# Patient Record
Sex: FEMALE | Race: WHITE | Employment: FULL TIME | URBAN - METROPOLITAN AREA
[De-identification: names, ages, dates, MRNs, and addresses within clinical notes are randomized per-mention and may not be internally consistent; named-entity substitution may affect disease eponyms.]

---

## 2018-10-09 ENCOUNTER — HOSPITAL ENCOUNTER (OUTPATIENT)
Dept: PHYSICAL THERAPY | Age: 48
Discharge: HOME | End: 2018-10-09
Payer: MEDICAID

## 2018-10-09 PROCEDURE — 92522 EVALUATE SPEECH PRODUCTION: CPT

## 2018-10-09 NOTE — PROGRESS NOTES
[facility], [address] [facility] [address] [phone]  Fx [phone] Plan of Care/ Statement of Necessity for Speech Therapy Services Patient name: Akanksha Alexis Start of Care: 10/9/2018 Referral source: Suzanne Talavera MD : 1970 Medical Diagnosis: Hoarseness [R49.0] Onset Date:  Treatment Diagnosis: Dysphonia [R49.0] Prior Hospitalization: see medical history Provider#: [id number] Medications: Verified on Patient summary List  
 Comorbidities: N/A Prior Level of Function: Normal vocal quality The Plan of Care and following information is based on the information from the initial evaluation. Assessment/ key information: 
  
Pt is a 41-year-old female referred to outpatient speech therapy due to vocal hoarseness. Pt visited an ENT; Endoscopy showed irritation to the vocal folds due to allergies. At this time, Pt reported feeling excessive tension in the laryngeal area and an uncomfortable feeling during phonation. Additionally, she reported feeling like her voice is lower in pitch and harder to project at work. Pt sustained phonation x15 seconds with observable hoarseness and diplophonia. Harsh vocal attack observed as well. No impact on vocal intensity and loudness. Pt also presented with reduced pitch during pitch glides and was unable to sustain a higher pitch without transitioning to a falsetto. Pt reported that she consumes excessive caffeine and does not drink much water. It is recommended that the Pt receive skilled voice therapy to improve vocal quality and to re-train Pt to rid the laryngeal area of tension for better vocal quality and pitch. Problem List:      []aphasic  []dysarthric  []dysphagic 
     []alexic  []agraphic  [x]dysphonia []dysfluency   []Cognitive-Linguistic Disorder 
     []other Treatment Plan may include any combination of the following: Voice Treatment, Patient Education and [employer] Voice Therapy Patient / Family readiness to learn indicated by: asking questions, trying to perform skills and interest 
 
Persons(s) to be included in education:   patient (P) Barriers to Learning/Limitations: None Patient Goal (s): Decrease trauma to my voice Patient Self Reported Health Status: excellent Rehabilitation Potential: excellent Short Term Goals: To be accomplished in 6 weeks 1) Pt will complete vocal fold stretches x15 with min cues in order to promote relaxation to the laryngeal area for phonation. 2) Pt will complete step 1 of Resonant Voice Therapy with no more than 2 cues for more forward-focused resonance to reduce laryngeal tension during phonation. 3) Pt will be able to state and explain at least 3 vocal hygiene tips to increase knowledge and use of strategies in day-to-day life. Long Term Goals: To be accomplished in 8 weeks 1) Pt will demonstrate improved vocal quality during phonation with min cues for forward resonance in conversations with family, friends, patients, and colleagues. Frequency / Duration: Patient to be seen 1 times per week for 6 weeks: 
 
Patient/ Caregiver education and instruction: Diagnosis, prognosis, treatment program, vocal health and hygeine Erin Barboza, DELMI 10/9/2018 3:25 PM 
________________________________________________________________________ I certify that the above Therapy Services are being furnished while the patient is under my care. I agree with the treatment plan and certify that this therapy is necessary. [de-identified] Signature:____________Date:_________TIME:________ 
 
[employer], Date and Time must be completed for valid certification ** Please sign and return to [facility] 
[address]  Chhaya Goldberg, [address] [facility] Ph [phone]  Fx [phone] Thank you

## 2018-10-18 ENCOUNTER — HOSPITAL ENCOUNTER (OUTPATIENT)
Dept: PHYSICAL THERAPY | Age: 48
Discharge: HOME | End: 2018-10-18
Payer: MEDICAID

## 2018-10-18 PROCEDURE — 92507 TX SP LANG VOICE COMM INDIV: CPT

## 2018-10-18 NOTE — PROGRESS NOTES
ST DAILY TREATMENT NOTE Patient Name: Leslie Dan Date:10/18/2018 : 1970 [x]  Patient  Verified Payor: Payor: [id number] / Plan: [address] [place] [address] / Product Type: Managed Care Medicaid / In time:12:30  Out time:1;15 Total Treatment Time (min): 45 Visit #: 2 of 6 Treatment Diagnosis: Dysphonia [R49.0] SUBJECTIVE Pain Level (0-10 scale): 0 Any medication changes, allergies to medications, adverse drug reactions, diagnosis change, or new procedure performed?: [x] No    [] Yes (see summary sheet for update) Subjective functional status/changes:   [x] No changes reported \"I've been trying not to clear my throat so much. \" OBJECTIVE Treatment provided includes: 
Increase/Improve: 
[x]  Voice Quality []  Cognitive Linguistic Skills []  Laryngeal/Pharyngeal Exercises  
[]  Vocal Loudness []  Reading Comprehension []  Swallowing Skills   
[]  Vocal Cord Function []  Auditory Comprehension []  Oral Motor Skills [x]  Resonance []  Writing Skills []  Compensatory strategies   
[]  Speech Intelligibility []  Expressive Language []  Attention []  Breath Support/Coord. []  Receptive language []  Memory []  Articulation []  Safety Awareness []   
[]  Fluency []  Word Retrieval [] Treatment Provided: 
Pt engaged in 3 vocal fold stretches x10 seconds each with min cues; Visible tension in laryngeal area during stretches Pt completed step 1 with excessive tension in laryngeal area resulting in harsh voice, required mod-max cues to feel the \"buzz\" for forward resonance Patient/Caregiver  Education: [x] Review HEP     
HEP/Handouts given: Continue HEP Pain Level (0-10 scale) post treatment: 0 
 
ASSESSMENT []   Improving appropriately and progressing toward goals [x]   Improving slowly and progressing toward goals 
[]   Approximating goals/maximum potential 
[x]   Continues to benefit from skilled therapy to address remaining functional deficits []   Not progressing toward goals and plan of care will be adjusted Patient will continue to benefit from skilled therapy to address remaining functional deficits: Dysphonia Progress towards goals / Updated goals: 
Pt introduced to Resonant Voice Therapy this session to promote the least amount of tension during phonation. Pt demonstrated understanding of the rationale behind the technique. She was able to hear and feel the difference between forward-focused and laryngeal-focused tone. PLAN[x]  Continue plan of care 
[]  Modify Goals/Treatment Plan     
[]  Discharge due to: 
[] Other: 
 
1) Pt will complete vocal fold stretches x15 with min cues in order to promote relaxation to the laryngeal area for phonation. 2) Pt will complete step 1 of Resonant Voice Therapy with no more than 2 cues for more forward-focused resonance to reduce laryngeal tension during phonation. 3) Pt will be able to state and explain at least 3 vocal hygiene tips to increase knowledge and use of strategies in day-to-day life. DELMI Lee 10/18/2018  2:10 PM 
 
Future Appointments Date Time Provider Oz Pelaez 10/25/2018  2:45 PM DELMI Pritchett [address] [facility]  
11/6/2018  1:45 PM DELMI Pritchett [address] [facility]

## 2018-10-25 ENCOUNTER — HOSPITAL ENCOUNTER (OUTPATIENT)
Dept: PHYSICAL THERAPY | Age: 48
Discharge: HOME | End: 2018-10-25
Payer: MEDICAID

## 2018-10-25 PROCEDURE — 92507 TX SP LANG VOICE COMM INDIV: CPT

## 2018-10-25 NOTE — PROGRESS NOTES
ST DAILY TREATMENT NOTE Patient Name: Samuel Castañeda Date:10/25/2018 : 1970 [x]  Patient  Verified Payor: Payor: [id number] / Plan: [address] [place] [address] / Product Type: Managed Care Medicaid / In time:3:20 (Pt late)  Out time:3:40 Total Treatment Time (min): 20 Visit #: 3 of 6 Treatment Diagnosis: Dysphonia [R49.0] SUBJECTIVE Pain Level (0-10 scale): 0 Any medication changes, allergies to medications, adverse drug reactions, diagnosis change, or new procedure performed?: [x] No    [] Yes (see summary sheet for update) Subjective functional status/changes:   [x] No changes reported \"I've been doing my exercises. \" OBJECTIVE Treatment provided includes: 
Increase/Improve: 
[x]  Voice Quality []  Cognitive Linguistic Skills []  Laryngeal/Pharyngeal Exercises  
[]  Vocal Loudness []  Reading Comprehension []  Swallowing Skills   
[]  Vocal Cord Function []  Auditory Comprehension []  Oral Motor Skills [x]  Resonance []  Writing Skills []  Compensatory strategies   
[]  Speech Intelligibility []  Expressive Language []  Attention []  Breath Support/Coord. []  Receptive language []  Memory []  Articulation []  Safety Awareness []   
[]  Fluency []  Word Retrieval [] Treatment Provided: 
Pt educated on additional exercises to assist with laryngeal tension Pt performed 2 exercises from the therapy program with improved lip buzz (forward resonance) Patient/Caregiver  Education: [x] Review HEP     
HEP/Handouts given: Continue Resonant Voice Therapy Pain Level (0-10 scale) post treatment: 0 
 
ASSESSMENT []   Improving appropriately and progressing toward goals [x]   Improving slowly and progressing toward goals 
[]   Approximating goals/maximum potential 
[x]   Continues to benefit from skilled therapy to address remaining functional deficits []   Not progressing toward goals and plan of care will be adjusted Patient will continue to benefit from skilled therapy to address remaining functional deficits: Dysphonia Progress towards goals / Updated goals: 
Continue Resonant Voice Therapy for improve forward resonance. Continue to encourage Pt to engage in relaxation exercises and stretches to reduce laryngeal tension. PLAN[x]  Continue plan of care 
[]  Modify Goals/Treatment Plan     
[]  Discharge due to: 
[] Other: 
 
1) Pt will complete vocal fold stretches x15 with min cues in order to promote relaxation to the laryngeal area for phonation. 2) Pt will complete step 1 of Resonant Voice Therapy with no more than 2 cues for more forward-focused resonance to reduce laryngeal tension during phonation. 3) Pt will be able to state and explain at least 3 vocal hygiene tips to increase knowledge and use of strategies in day-to-day life. DELMI Cuevas 10/25/2018  2:59 PM 
 
Future Appointments Date Time Provider Oz Pelaez 11/6/2018  1:45 PM Nikolas Hernandez, SLP [facility]

## 2018-11-06 ENCOUNTER — HOSPITAL ENCOUNTER (OUTPATIENT)
Dept: PHYSICAL THERAPY | Age: 48
Discharge: HOME | End: 2018-11-06
Payer: MEDICAID

## 2018-11-06 PROCEDURE — 92507 TX SP LANG VOICE COMM INDIV: CPT

## 2018-11-06 NOTE — PROGRESS NOTES
ST DAILY TREATMENT NOTE Patient Name: Jose Ramon Strickland Date:2018 : 1970 [x]  Patient  Verified Payor: Payor: Misty Lowe / Plan: [address] [place] [address] / Product Type: Managed Care Medicaid / In time:1:45  Out time:2:30 Total Treatment Time (min): 45 Visit #: 1 of 6 Treatment Diagnosis: Dysphonia [R49.0] SUBJECTIVE Pain Level (0-10 scale): 0 Any medication changes, allergies to medications, adverse drug reactions, diagnosis change, or new procedure performed?: [x] No    [] Yes (see summary sheet for update) Subjective functional status/changes:   [x] No changes reported \"I feel more hoarse, I was rivka a lot of stress this weekend. \" OBJECTIVE Treatment provided includes: 
Increase/Improve: 
[]  Voice Quality []  Cognitive Linguistic Skills []  Laryngeal/Pharyngeal Exercises  
[]  Vocal Loudness []  Reading Comprehension []  Swallowing Skills [x]  Vocal Cord Function []  Auditory Comprehension []  Oral Motor Skills [x]  Resonance []  Writing Skills []  Compensatory strategies   
[]  Speech Intelligibility []  Expressive Language []  Attention []  Breath Support/Coord. []  Receptive language []  Memory []  Articulation []  Safety Awareness []   
[]  Fluency []  Word Retrieval [] Treatment Provided: 
Pt required to cues to complete step 1 of resonant Voice Therapy, Noted hoarseness especially during higher pitches Pt introduced to easy onset in isolated vowels; Needed cues to gradually initiate voicing after \"h\" production for wide open glottis Patient/Caregiver  Education: [x] Review HEP     
HEP/Handouts given: Continue HEP Pain Level (0-10 scale) post treatment: 0 
 
ASSESSMENT []   Improving appropriately and progressing toward goals [x]   Improving slowly and progressing toward goals 
[]   Approximating goals/maximum potential 
[x]   Continues to benefit from skilled therapy to address remaining functional deficits []   Not progressing toward goals and plan of care will be adjusted Patient will continue to benefit from skilled therapy to address remaining functional deficits: Dysphonia Progress towards goals / Updated goals: 
Pt introduced to Resonant Voice Therapy. She demonstrates understanding of the rationale for using the program, but continues to show difficulty with laryngeal versus forward tone. However, Pt has demonstrated improvement in her self-awareness of tone differences during structured and unstructured tasks. It is recommended that the Pt continue to receive skilled speech and language therapy. Resonant Voice Therapy will be continued for improved resonance and forward-focused tone. Additionally, we will begin easy onset training to prevent harsh vocal fold attacks during phonation. PLAN[x]  Continue plan of care 
[]  Modify Goals/Treatment Plan     
[]  Discharge due to: 
[] Other: 
 
Goals for this certification period to be accomplished in 6 weeks: 
1) Pt will complete step 1 of Resonant Voice Therapy with no more than 2 cues for more forward-focused resonance to reduce laryngeal tension during phonation. 2) Pt will engage in isolated vowel production using the easy onset technique with 85% accuracy in order to promote relaxed adduction of the vocal folds during phonation. 3) Pt will produce 4-6 word sentences incorporating initial /h/ words for easy onset with 85% accuracy in order to promote easy speech during conversational exchanges. 
[address], SLP 11/6/2018  12:32 PM 
 
Future Appointments Date Time Provider Oz Pelaez 11/6/2018  1:45 PM Rochelle Napier , SLP [facility]  
11/15/2018  1:45 PM Esa Jane, SLP [facility]  
11/20/2018  1:45 PM Esa Jane, SLP [facility]

## 2018-11-06 NOTE — PROGRESS NOTES
[facility] 
[address], [address] [facility] Ph [phone]  Fx [phone] Continued Plan of Care/ Re-certification for Speech Therapy Services Patient name: Carlos Villatoro Start of Care: 10/9/18 Referral source: Lanis Koyanagi, MD : 1970 Medical/Treatment Diagnosis: Hoarseness [R49.0] Onset Date: Prior Hospitalization: see medical history Provider#: [id number] Medications: Verified on Patient Summary List   
Comorbidities: N/A Prior Level of Function: Normal vocal quality Visits from Start of Care: 3    Missed Visits: 1 The Plan of Care and following information is based on the patient's current status: 
 
*Pt's schedule is very busy and sometimes prevents her from arriving on time to appointments. Current goals should be included in the next therapy block for consistency. Goal: Pt will complete vocal fold stretches x15 with min cues in order to promote relaxation to the laryngeal area for phonation. Status at eval: Visible tension in laryngeal musculature, especially during phonation Current Status: GOAL MET; Pt requires 0 cues to complete stretching for the laryngeal area Goal: Pt will complete step 1 of Resonant Voice Therapy with no more than 2 cues for more forward-focused resonance to reduce laryngeal tension during phonation. Status at eval: Harsh, laryngeal-focused tone; Hoarseness and difficulty with pitch variations Current Status: Progressing, Pt requires 2-4 cues to produce forward focused tone during exercises; Harsh onset observed Goal: Pt will be able to state and explain at least 3 vocal hygiene tips to increase knowledge and use of strategies in day-to-day life. Status at eval: Pt consumed a great deal of caffeine, consistent throat clearer, loud talker, all reported by PT; She was unaware of vocal hygeine Current Status: GOAL MET; Pt demonstrates understanding of common vocal hygiene tips (hydration, decreasing caffeine intake, etc.) Key functional changes: Pt introduced to Resonant Voice Therapy. She demonstrates understanding of the rationale for using the program, but continues to show difficulty with laryngeal versus forward tone. However, Pt has demonstrated improvement in her self-awareness of tone differences during structured and unstructured tasks. Problems/ barriers to goal attainment: Busy work schedule Problem List:      []aphasic  []dysarthric  []dysphagic 
     []alexic  []agraphic  [x]dysphonia []dysfluency  []Cognitive-Linguistic Disorder 
     []other Treatment Plan: Voice Treatment and Patient Education Patient Goal (s) has been updated and includes: Easy onset, Resonant Voice Therapy, Pt education of vocal hygeine Goals for this certification period to be accomplished in 6 weeks: 
1) Pt will complete step 1 of Resonant Voice Therapy with no more than 2 cues for more forward-focused resonance to reduce laryngeal tension during phonation. 2) Pt will engage in isolated vowel production using the easy onset technique with 85% accuracy in order to promote relaxed adduction of the vocal folds during phonation. 3) Pt will produce 4-6 word sentences incorporating initial /h/ words for easy onset with 85% accuracy in order to promote easy speech during conversational exchanges. Frequency / Duration: Patient to be seen 1 times per week for 6 weeks: 
Assessment/Recommendations: It is recommended that the Pt continue to receive skilled speech and language therapy. Resonant Voice Therapy will be continued for improved resonance and forward-focused tone. Additionally, we will begin easy onset training to prevent harsh vocal fold attacks during phonation. Certification Period: 11/6/18 to 12/5/18 DELMI Driscoll 11/6/2018 11:49 AM 
 
_____________________________________________________________________ I certify that the above Therapy Services are being furnished while the patient is under my care. I agree with the treatment plan and certify that this therapy is necessary. []  I have read the above report and request that my patient continue as recommended. []  I have read the above report and request that my patient continue therapy with the following changes/special instructions:________________________________________ []I have read the above report and request that my patient be discharged from therapy. [de-identified] Signature:____________Date:_________TIME:________ 
 
[employer], Date and Time must be completed for valid certification ** Please sign and return to [facility] 
[address] Chhaya Goldberg, [address] [facility] [address] [phone]  Fx [phone]

## 2018-11-15 ENCOUNTER — APPOINTMENT (OUTPATIENT)
Dept: PHYSICAL THERAPY | Age: 48
End: 2018-11-15
Payer: MEDICAID

## 2018-11-20 ENCOUNTER — APPOINTMENT (OUTPATIENT)
Dept: PHYSICAL THERAPY | Age: 48
End: 2018-11-20
Payer: MEDICAID

## 2018-12-13 NOTE — PROGRESS NOTES
[facility]  [address] [facility] [address] [place], [address]  Ph [phone]  Fx [phone]    Speech Therapy Discharge Summary    Patient name: Florence Velasquez Start of Care: 10/9/18   Referral source: Tanesha Mcduffie MD : 1970   Medical/Treatment Diagnosis: Dysphonia [R49.0] Onset Date:     Prior Hospitalization: see medical history Provider#: [id number]   Medications: Verified on Patient Summary List    Comorbidities: N/A  Prior Level of Function:Normal vocal quality    Visits from Start of Care: 4    Missed Visits: 1    Summary of Care:  Goal: 1) Pt will complete step 1 of Resonant Voice Therapy with no more than 2 cues for more forward-focused resonance to reduce laryngeal tension during phonation. Status at start of goal: Excessive tension in laryngeal area resulting in harsh voice, required mod-max cues to feel the \"buzz\" for forward resonance  Status at last note/certification: Pt requires 2-4 cues to produce forward focused tone during exercises; Harsh onset observed  Status at discharge: Progressing; Pt required mod-to-max cues to promote forward resonance    Goal: Pt will engage in isolated vowel production using the easy onset technique with 85% accuracy in order to promote relaxed adduction of the vocal folds during phonation. GOAL WAS NOT INITATED    Goal: Pt will produce 4-6 word sentences incorporating initial /h/ words for easy onset with 85% accuracy in order to promote easy speech during conversational exchanges. GOAL WAS NOT INITATED    ASSESSMENT: Pt willingly participated in Resonant Voice Therapy. She required consistent cueing to prevent laryngeal tension and strain during phonation. Pt explained that her work schedule had become very busy and that she would contact the office should she need additional therapy. Please do not hesitate to refer Pt back for skilled voice therapy.     RECOMMENDATIONS:  []Discontinue therapy: []Patient has reached or is progressing toward set goals      [x]Patient is non-compliant or has abdicated      []Due to lack of appreciable progress towards set [address] [employer] , SLP 12/13/2018 10:55 AM

## 2019-06-27 ENCOUNTER — HOSPITAL ENCOUNTER (OUTPATIENT)
Dept: PHYSICAL THERAPY | Age: 49
Discharge: HOME | End: 2019-06-27
Payer: COMMERCIAL

## 2019-06-27 PROCEDURE — 97530 THERAPEUTIC ACTIVITIES: CPT

## 2019-06-27 PROCEDURE — 97140 MANUAL THERAPY 1/> REGIONS: CPT

## 2019-06-27 PROCEDURE — 97110 THERAPEUTIC EXERCISES: CPT

## 2019-06-27 PROCEDURE — 97162 PT EVAL MOD COMPLEX 30 MIN: CPT

## 2019-06-27 NOTE — PROGRESS NOTES
PT DAILY TREATMENT NOTE/CERVICAL [id number]    Patient Name: Moon العلي  Date:2019  : 1970  [x]  Patient  Verified  Payor: /    In time:110  Out time:210  Total Treatment Time (min): 60  Visit #: 1 of 12    Medicare/BCBS Only   Total Timed Codes (min):  45 1:1 Treatment Time:  60     Treatment Area: Cervical radiculopathy [M54.12]    SUBJECTIVE  Pain Level (0-10 scale): 2/10 without activity, 3/10 with activity  []constant []intermittent []improving []worsening []no change since onset    Any medication changes, allergies to medications, adverse drug reactions, diagnosis change, or new procedure performed?: [x] No    [] Yes (see summary sheet for update)  Subjective functional status/changes: patient reports onset of CS and right arm radicular pain in 2019. Currently symptoms are  less sevcere. Lasting for several months. Startied doing traction and using salt bath. After that significant improvement. Initial pain pain 7/10 including right arm tingling C6. Still intermittent numbness. Pain triggered by CS Ext as well as Flex, Also Rotation is restricted and painful. Difficulty with head turns while driving , with prolonged computer work and surgery. No regular workout routine currently. No previous hx of neck pain. No reported weakness. No HA. Muscle tension. Nocturnal pain. Side sleeper left or right. Using one flat pillow. Most pain with computer work triggered neck pain. Also triggered by surgery up to 14-16 hours for 2 days each week. Patient is interested in an ergonomic assessment and I will contact Scottie Drummond in regards to this.    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  Limitations to PLOF: difficulty to maintain abnormal neck posture during prolonged surgeries and also during computer work   Mechanism of Injury: repetitive strain during work related activities  Current symptoms/Complaints: CS pain /tension right>left with intermittent right radiculopathy  Previous Treatment/Compliance: use of home traction unit  PMHx/Surgical Hx: n/a  Work Hx: surgeon at [employer]  Living Situation: WNL  Pt Goals: be able to work without arm/neck pain  Barriers: []pain []financial []time []transportation []other  Motivation: excellent  Substance use: []Alcohol []Tobacco []other:   FABQ Score: []low []elevate  Cognition: A & O x 3    Other:    OBJECTIVE/EXAMINATION  Domestic Life: WNL  Activity/Recreational Limitations: no regular workout routine  Mobility: WNL  Self Care:  WNL        Modality rationale: Pain control, muscle relaxation   Min Type Additional Details    [] Estim:  []Unatt       []IFC  []Premod                        []Other:  []w/ice   []w/heat  Position:  Location:    [] Estim: []Att    []TENS instruct  []NMES                    []Other:  []w/US   []w/ice   []w/heat  Position:  Location:    []  Traction: [] Cervical       []Lumbar                       [] Prone          []Supine                       []Intermittent   []Continuous Lbs:  [] before manual  [] after manual    []  Ultrasound: []Continuous   [] Pulsed                           []1MHz   []3MHz Location:  W/cm2:    []  Iontophoresis with dexamethasone         Location: [] Take home patch   [] In clinic   10 []  Ice     [x]  heat  []  Ice massage  []  Laser   []  Anodyne Position:supine 90/90  Location:CS    []  Laser with stim  []  Other: Position:  Location:    []  Vasopneumatic Device Pressure:       [] lo [] med [] hi   Temperature: [] lo [] med [] hi   [] Skin assessment post-treatment:  []intact []redness- no adverse reaction    []redness  adverse reaction:     15 min [x]Eval                  []Re-Eval       10 min Therapeutic Exercise:  [x] See flow sheet :shoulder and CS ROM   Rationale: increase ROM and increase strength     10 min Therapeutic Activity:  [x]  See flow sheet :instruction in HEP, POC, discussed benefit from ergonomic assessment during work in [facility] [address] [place] [address] and when doing documentation    Rationale: good understanding of treatment rationale       15 min Manual Therapy:  Gentle upper CS mobs, mid CS facet gliding/mobs, SOR, PA mobs at CT junction, functional massage   Rationale: decrease pain, increase ROM and increase tissue extensibility               With   [] TE   [x] TA   [] neuro   [] other: Patient Education: [x] Review HEP    [] Progressed/Changed HEP based on:   [] positioning   [] body mechanics   [] transfers   [] heat/ice application    [] other:      Other Objective/Functional Measures: as per eval    Physical Therapy Evaluation Cervical Spine     SUBJECTIVE  Chief Complaint:CS pain, radicular pain/paresthesia right arm    Mechanism of injury:chronic overuse, postural    Symptoms  Aggravated by:   [x] Bending [x] Sitting [] Standing [x] Reaching Overhead with CS Ext   [] Moving [] Cough [] Sneeze [] Eating   [] AM  [] PM  Lying:  [] sup   [x] pro   [] sidelying   [] Other:     Eased by:    [] Bending [] Sitting [] Standing Lying: [x] sup  [] pro  [] sidelying   [] Moving [] AM  [] PM  [x] Other:rest, change in posture     General Health:  Red Flags Indicated? [] Yes    [x] No  [] Yes [] No Recent weight change (If yes, due to dieting?  [] Yes  [] No)   [] Yes [] No Persistent cough  [] Yes [] No Unremitting pain at night  [] Yes [] No Dizziness  [] Yes [] No Blurred vision  [] Yes [] No Hands more cold or painful in cold weather  [] Yes [] No Ringing in ears  [] Yes [] No Difficulty swallowing  [] Yes [] No Dysfunction of bowel or bladder  [] Yes [] No Recent illness within past 3 weeks (i.e, cold, flu)  [] Yes [] No Jaw pain    Past History/Treatments:use of home traction unit and salt bath    Diagnostic Tests: [] Lab work [x] X-rays    [] CT [x] MRI     [] Other:  Results:MRI showed C2/3 WNL, C3/4 mild disc, osteophytes, C4/5 mild right neural foraminal stenosis  C5/6 prominent disc /stenosis    Functional Status  Prior level of function:full function but hx of intermittent CS pain  Present functional limitations:difficutly to perform work duties in [place]  What position do you sleep in?:        OBJECTIVE  Posture: [] WNL  Head Position:FHP/mild  Shoulder/Scapular Position:left shoulder slightly elevated  C-Kyphosis:  [] increased   [] decreased   C-Lordosis:   [x] increased   [] decreased  T-Kyphosis:  [x] increased  Upper CS [] decreased  T-Lordosis:   [] increased   [] decreased     TMJ: [x] N/A [] Abnormal - ROM:   Palpation:    Cervical Retraction: [] WNL    [x] Abnormal:limited retraction due to habitual postural alignment    Shoulder/Scapular Screen: [x] WNL    [] Abnormal:    Active Movements: [] N/A   [] Too acute   [] Other:  ROM % AROM % PROM Comments:pain, area   Forward flexion 30   Pain right CS   Extension 50  Pain   SB right 20  pain   SB left  20     Rotation right 40     Rotation left 40  pain     Thoracic Spine: [] N/A    [] WNL   [x] Other:mid TS hypomobility    Right shoulder Flex 155, left 170    Palpation:  [] Min  [x] Mod  [] Severe    Location:CT junction  [] Min  [] Mod  [] Severe    Location:  [] Min  [] Mod  [] Severe    Location:    Neuro Screen (myotome/dematome/felexes): [x] WNL  Myotome Level Muscle Test Myotome Level Muscle Test   C5 Shoulder Adduction - Deltoid C8 Finger Flexors   C6 Wrist Extension T1 Finger Abduction - Interossei   C7 Elbow Extension     Comments:  Upper Limb Tension Tests: [] N/A       Ulnar: [] R    [] L    [] +    [] -       Median: [] R    [] L    [] +    [] -       Radial: [] R    [] L    [] +    [] -    Special Tests:  Cervical:        Vertebral Artery:  [] R    [] L    [] +    [] -       Alar Ligament: [] R    [] L    [] +    [] -       Transverse Lig: [] R    [] L    [] +    [] -       Spurling's:  [x] R    [] L    [x] +    [] -       Distraction:  [x] R    [x] L    [x] +    [] -       Compression: [x] R    [x] L    [x] +    [] -    Thoracic Outlet Tests: [] N/A       Adson's:  [] R    [] L    [] +    [] -       Hyperabduction: [] R    [] L    [] +    [] -       Pardeep's:  [] R    [] L    [] +    [] -       Jannette Bedoya:  [] R    [] L    [] +    [x] -    Diaphragmatic Breathing: [] Normal    [x] Abnormal: slightly shallow breathing, rib cage hypomobility    Muscle Flexibility: [] N/A   Scalenes: [] WNL    [x] Tight    [x] R    [x] L   Upper Trap: [] WNL    [x] Tight    [x] R    [] L   Levator: [] WNL    [] Tight    [] R    [] L   Pect. Minor: [] WNL    [] Tight    [] R    [] L    Global Muscular Weakness: [] N/A   Lower Trap:4   Rhomboids:4   Middle Trap:4   Serratus Ant:4   Ext Rotators:4   Other:    Other tests/comments:HGIR right 70, left 90       Pain Level (0-10 scale) post treatment: 4/10    ASSESSMENT/Changes in Function: responding well to MT, reduction in pain with manual distraction    Patient will continue to benefit from skilled PT services to address ROM deficits, address strength deficits, analyze and address soft tissue restrictions, analyze and cue movement patterns and assess and modify postural abnormalities to attain remaining goals.      [x]  See Plan of Care  []  See progress note/recertification  []  See Discharge Summary         Progress towards goals / Updated goals:  Patient is highly motivated and has a good understanding of her postural deficits     PLAN  []  Upgrade activities as tolerated     [x]  Continue plan of care and initiate ergonomic evaluation of work related posture/set up  []  Update interventions per flow sheet       []  Discharge due to:_  []  Other:_      Brett Jacobo PT 6/27/2019  1:13 PM

## 2019-06-27 NOTE — PROGRESS NOTES
[facility]  [address] 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 Assessment/ key information: 47 YOF is presenting to PT with recent exacerbation of neck shoulder and radicular right arm pain especially with work related postures performing surgery and also with prolonged computer work. Pain is rated between 3-9/10. MRI results are indicative of mild degeneration at C3/4  disc, osteophytes, C4/5 mild right neural foraminal stenosis and stenosis at C5/6. 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 Patient is highly motivated and a good candidate for skilled PT.   Evaluation Complexity History MEDIUM  Complexity : 1-2 comorbidities / personal factors will impact the outcome/ POC ; Examination MEDIUM Complexity : 3 Standardized tests and measures addressing body structure, function, activity limitation and / or participation in recreation  ;Presentation MEDIUM Complexity : Evolving with changing characteristics  ; Clinical Decision Making MEDIUM Complexity : FOTO score of 26-74  Overall Complexity Rating: MEDIUM  Problem List: decrease ROM, decrease activity tolerance and decrease flexibility/ joint mobility   Treatment Plan may include any combination of the following: Therapeutic exercise, Therapeutic activities, Neuromuscular re-education, Physical agent/modality, Manual therapy and Patient education  Patient / Family readiness to learn indicated by: trying to perform skills and interest  Persons(s) to be included in education: patient (P)  Barriers to Learning/Limitations: None  Measures taken if barriers to learning: n/a  Patient Goal (s): reduce my symptoms and be able to work without pain  Patient Self Reported Health Status: excellent  Rehabilitation Potential: good    Short Term Goals: To be accomplished in 3 weeks:   1. Patient has good understanding of correct postural alignment and breathing pattern to allow self correction during ADL/work  Status at [facility]: [facility] causing chronic strain to cervicothoracic junction, altered breathing pattern associated with neck tension /compensatory use of neck musculature    2. Patient reports reduction of pain to <or= 5/10 with all ROM and ADL  Status at [facility]: pain ranging 1-7/10, intermittent right arm pain ( C6 )    3. Reduction in radicular right arm symptoms by >or= 50% for increased ease with all activities  Status at [facility]: frequent right arm pain and paresthesia mike with work related postures during surgery    4. Patient reports reduction of pain to or= 3/10 with all recreational and work activities  Status at [facility]: pain 1-7/10    5. Improved SB and Rotation by >or= 10 deg for increased ease with head turns while driving  Status at [facility]: SB 20 deg , Rot 40 deg with right CS pain       Long Term Goals: To be accomplished in 6 weeks:   1.  Improved FOTO score to >or= 69/100 as evidence of improved function in regards to head turns when driving, work related activities and ADL  Status at Eval: FOTO 59/100    2. Ability to look up at a bird or sherrie evidenced by increased CS ROM to >o= 60 deg  Status at Eval: Ext 50 deg with pain right CS    3. Improved postural strength to allow  Proper alignment during all activities to minimize CS strain  Status at Eval: deficit in postural strength, habitual FHP    Frequency / Duration: Patient to be seen 1-2 times per week for 6 weeks. Patient/ Caregiver education and instruction: Diagnosis, prognosis, activity modification and exercises   [x]  Plan of care has been reviewed with PTA    Certification Period: n/a  Stacia Lorenzo, PT 6/27/2019 6:34 PM    ________________________________________________________________________    I certify that the above Therapy Services are being furnished while the patient is under my care. I agree with the treatment plan and certify that this therapy is necessary.     Physician's Signature:_____________________Date:____________TIME:________    [employer], Date and Time must be completed for valid certification **  Please sign and return to [facility] [address] [place], [address] [facility] [address] [facility]  Ph [phone]  Fx [phone]

## 2019-07-11 ENCOUNTER — HOSPITAL ENCOUNTER (OUTPATIENT)
Dept: PHYSICAL THERAPY | Age: 49
Discharge: HOME | End: 2019-07-11
Payer: COMMERCIAL

## 2019-07-11 PROCEDURE — 97112 NEUROMUSCULAR REEDUCATION: CPT

## 2019-07-11 PROCEDURE — 97530 THERAPEUTIC ACTIVITIES: CPT

## 2019-07-11 PROCEDURE — 97140 MANUAL THERAPY 1/> REGIONS: CPT

## 2019-07-11 NOTE — PROGRESS NOTES
PT DAILY TREATMENT NOTE    Patient Name: Adelina Pack  Date:2019  : 1970  [x]  Patient  Verified  Payor: Hitesh Freedman / Plan: Anum Gunn / Product Type: [employer] /    [facility] Financial time:245  Total Treatment Time (min): 45  Total Timed Codes (min): 45  1:1 Treatment Time ( [address] only): 39   Visit #: 2 of 12    Treatment Area: Cervical radiculopathy [M54.12]    SUBJECTIVE  Pain Level (0-10 scale): 3/10  Any medication changes, allergies to medications, adverse drug reactions, diagnosis change, or new procedure performed?: [x] No    [] Yes (see summary sheet for update)  Subjective functional status/changes:   [] No changes reported  Pt reports that her neck just feels terribly stiff all the time. She states that she just fells tired all the time.   She states that she thinks her stress makes her neck muscles worse    OBJECTIVE        10 min Therapeutic Activity:  []  See flow sheet :life balance, restorative yoga, yin yoga   Rationale: increase ROM, improve coordination, improve balance and increase proprioception  to improve the patients ability to reduce stress     10 min Neuromuscular Re-education:  []  See flow sheet :restorative yoga elevated bound angle   Rationale: increase ROM, improve coordination and increase proprioception  to improve the patients ability to complete surgical day wihtout pain    25 min Manual Therapy: SOR, DTM to scalenes, SCM, myofascial arm pulls, shoulder releases   Rationale: decrease pain, increase ROM, increase tissue extensibility, decrease edema  and decrease trigger points to improve the patients ability to complete surgical day without pain            With   [x] TE   [] TA   [] neuro   [] other: Patient Education: [x] Review HEP    [] Progressed/Changed HEP based on:   [x] positioning   [x] body mechanics   [] transfers   [] heat/ice application    [x] other:restorative yoga, functional medicine, hormone balance, adrenal fatigue      Other Objective/Functional Measures:    Pain Level (0-10 scale) post treatment: 2/10    ASSESSMENT/Changes in Function: Pt demonstrates significant restrictions through scalenes and SCm contribuitng to all the pain she is experiencing. Educated pt about life balance to help reduce stress that is increasing tone in her neck muscles    Patient will continue to benefit from skilled PT services to address functional mobility deficits, address ROM deficits, address strength deficits, analyze and address soft tissue restrictions, analyze and cue movement patterns, analyze and modify body mechanics/ergonomics and assess and modify postural abnormalities to attain remaining goals. []  See Plan of Care  []  See progress note/recertification  []  See Discharge Summary         Progress towards goals / Updated goals:  Short Term Goals: To be accomplished in 3 weeks:               1. Patient has good understanding of correct postural alignment and breathing pattern to allow self correction during ADL/work  Status at [facility]: [facility] causing chronic strain to cervicothoracic junction, altered breathing pattern associated with neck tension /compensatory use of neck musculature     2. Patient reports reduction of pain to <or= 5/10 with all ROM and ADL  Status at [facility]: pain ranging 1-7/10, intermittent right arm pain ( C6 )     3. Reduction in radicular right arm symptoms by >or= 50% for increased ease with all activities  Status at [facility]: frequent right arm pain and paresthesia mike with work related postures during surgery     4. Patient reports reduction of pain to or= 3/10 with all recreational and work activities  Status at [facility]: pain 1-7/10     5. Improved SB and Rotation by >or= 10 deg for increased ease with head turns while driving  Status at [facility]: SB 20 deg , Rot 40 deg with right CS pain        Long Term Goals:  To be accomplished in 6 weeks:               1. Improved FOTO score to >or= 69/100 as evidence of improved function in regards to head turns when driving, work related activities and ADL  Status at Eval: FOTO 59/100     2.  Ability to look up at a bird or sherrie evidenced by increased CS ROM to >o= 60 deg  Status at Eval: Ext 50 deg with pain right CS     3.Improved postural strength to allow  Proper alignment during all activities to minimize CS strain  Status at Eval: deficit in postural strength, habitual FHP        PLAN  []  Upgrade activities as tolerated     []  Continue plan of care  []  Update interventions per flow sheet       []  Discharge due to:_  []  Other:_      Elena Sr PTA 7/11/2019  1:41 PM    Future Appointments   Date Time Provider Oz Pelaez   7/11/2019  2:00 PM [facility]   7/18/2019  3:30 PM Norman Jameson [facility]   7/25/2019  2:00 PM Norman Jameson PTA [facility]

## 2019-07-25 ENCOUNTER — HOSPITAL ENCOUNTER (OUTPATIENT)
Dept: PHYSICAL THERAPY | Age: 49
Discharge: HOME | End: 2019-07-25
Payer: COMMERCIAL

## 2019-07-25 PROCEDURE — 97110 THERAPEUTIC EXERCISES: CPT

## 2019-07-25 PROCEDURE — 97140 MANUAL THERAPY 1/> REGIONS: CPT

## 2019-07-25 PROCEDURE — 97112 NEUROMUSCULAR REEDUCATION: CPT

## 2019-07-25 NOTE — PROGRESS NOTES
PT DAILY TREATMENT NOTE    Patient Name: Korin Shin  Date:2019  : 1970  [x]  Patient  Verified  Payor: Isaías Alexis / Plan: Joey Blackburn / Product Type: [employer] /    [facility] time:250  Total Treatment Time (min): 50  Total Timed Codes (min): 50  1:1 Treatment Time ( [address] only): 48   Visit #: 3 of 12    Treatment Area: Cervical radiculopathy [M54.12]    SUBJECTIVE  Pain Level (0-10 scale): 0/10 stiff  Any medication changes, allergies to medications, adverse drug reactions, diagnosis change, or new procedure performed?: [x] No    [] Yes (see summary sheet for update)  Subjective functional status/changes:   [] No changes reported  Pt reports that she goat a massaage last week and felt great however after surgery on Monday she was barely functional afterwards. She States that she is trying to address her adrenal fatigue and stress. OBJECTIVE      15 min Neuromuscular Re-education:  []  See flow sheet :   Rationale: increase ROM, improve coordination and increase proprioception  to improve the patients ability to complete a surgical day pain free    35 min Manual Therapy:  SOR, DTM to bilateral scalenes, myofascial arm pulls, scapular mobes,    Rationale: decrease pain, increase ROM, increase tissue extensibility, decrease edema  and decrease trigger points to improve the patients ability to finish a surgical day pain free            With   [] TE   [] TA   [x] neuro   [x] other: Patient Education: [x] Review HEP    [] Progressed/Changed HEP based on:   [x] positioning   [x] body mechanics   [] transfers   [] heat/ice application    [] other: myofascial balls to release tone     Other Objective/Functional Measures:     Pain Level (0-10 scale) post treatment: 0/10    ASSESSMENT/Changes in Function: Pt demonstrates continued forward shoulders and head contributing to ongoing symptoms. Discuessed self care habits to help reduce tone.   Pt encouraged to attend twice weekly to improve function. Patient will continue to benefit from skilled PT services to address functional mobility deficits, address ROM deficits, address strength deficits, analyze and address soft tissue restrictions, analyze and cue movement patterns, analyze and modify body mechanics/ergonomics and assess and modify postural abnormalities to attain remaining goals. []  See Plan of Care  []  See progress note/recertification  []  See Discharge Summary         Progress towards goals / Updated goals:  Short Term Goals: To be accomplished in 3 weeks:               1. Patient has good understanding of correct postural alignment and breathing pattern to allow self correction during ADL/work  Status at [facility]: [facility] causing chronic strain to cervicothoracic junction, altered breathing pattern associated with neck tension /compensatory use of neck musculature     2. Patient reports reduction of pain to <or= 5/10 with all ROM and ADL  Status at [facility]: pain ranging 1-7/10, intermittent right arm pain ( C6 )     3. Reduction in radicular right arm symptoms by >or= 50% for increased ease with all activities  Status at [facility]: frequent right arm pain and paresthesia mike with work related postures during surgery     4. Patient reports reduction of pain to or= 3/10 with all recreational and work activities  Status at [facility]: pain 1-7/10     5. Improved SB and Rotation by >or= 10 deg for increased ease with head turns while driving  Status at [facility]: SB 20 deg , Rot 40 deg with right CS pain        Long Term Goals: To be accomplished in 6 weeks:               1. Improved FOTO score to >or= 69/100 as evidence of improved function in regards to head turns when driving, work related activities and ADL  Status at [facility]: FOTO 59/100     2.  Ability to look up at a bird or sherrie evidenced by increased CS ROM to >o= 60 deg  Status at [facility]: Ext 50 deg with pain right CS     3.Improved postural strength to allow  Proper alignment during all activities to minimize CS strain  Status at [facility]: deficit in postural strength, habitual FHP    PLAN  []  Upgrade activities as tolerated     []  Continue plan of care  []  Update interventions per flow sheet       []  Discharge due to:_  []  Other:_      Rita Brandt, hospitals 7/25/2019  3:07 PM    Future Appointments   Date Time Provider Oz Pelaez   8/1/2019  1:15 PM Karyn Leavitt, [facility]   8/6/2019  1:45 PM Karyn Leavitt, [facility]   8/8/2019  2:45 PM Karyn Leavitt, [facility]   8/13/2019  1:45 PM Karyn Leavitt, [facility]

## 2019-08-01 ENCOUNTER — HOSPITAL ENCOUNTER (OUTPATIENT)
Dept: PHYSICAL THERAPY | Age: 49
Discharge: HOME | End: 2019-08-01
Payer: COMMERCIAL

## 2019-08-01 PROCEDURE — 97110 THERAPEUTIC EXERCISES: CPT

## 2019-08-01 NOTE — PROGRESS NOTES
PT DAILY TREATMENT NOTE    Patient Name: Tara Castillo  Date:2019  : 1970  [x]  Patient  Verified  Payor: Carson Snider / Plan: Roberto Rodriguez / Product Type: [employer] /    [employer] time:150  Total Treatment Time (min): 35  Total Timed Codes (min): 35  1:1 Treatment Time ([facility] only): 35   Visit #: 4 of 12    Treatment Area: Cervical radiculopathy [M54.12]    SUBJECTIVE  Pain Level (0-10 scale):0/10 pain but 8/10 for tension   Any medication changes, allergies to medications, adverse drug reactions, diagnosis change, or new procedure performed?: [x] No    [] Yes (see summary sheet for update)  Subjective functional status/changes:   [] No changes reported  Pt reports that she thinks she is better in that she has more awareness. She has plans of getting more regullar massages    OBJECTIVE          35 min Manual Therapy: SOR, DTM to scalenes, SCM, lev scap and UT, CROM   Rationale: decrease pain, increase ROM, increase tissue extensibility, decrease edema  and decrease trigger points to improve the patients ability to complete surgical day          With   [] TE   [] TA   [] neuro   [] other: Patient Education: [x] Review HEP    [] Progressed/Changed HEP based on:   [] positioning   [] body mechanics   [] transfers   [] heat/ice application    [] other:      Other Objective/Functional Measures:     Pain Level (0-10 scale) post treatment: 4/10 tension and 0/10 pain    ASSESSMENT/Changes in Function: Pt demonstrates improving postural alignment and improved functional movement.   She still requires cueing for reducing stress and improving daily life skills    Patient will continue to benefit from skilled PT services to address functional mobility deficits, address ROM deficits, address strength deficits, analyze and address soft tissue restrictions, analyze and cue movement patterns, analyze and modify body mechanics/ergonomics and assess and modify postural abnormalities to attain remaining goals. []  See Plan of Care  []  See progress note/recertification  []  See Discharge Summary         Progress towards goals / Updated goals:  Short Term Goals: To be accomplished in 3 weeks:               1. Patient has good understanding of correct postural alignment and breathing pattern to allow self correction during ADL/work  Status at [facility]: [facility] causing chronic strain to cervicothoracic junction, altered breathing pattern associated with neck tension /compensatory use of neck musculature  Current: Improving     2. Patient reports reduction of pain to <or= 5/10 with all ROM and ADL  Status at [facility]: pain ranging 1-7/10, intermittent right arm pain ( C6 )  Current: No pan but tension today     3. Reduction in radicular right arm symptoms by >or= 50% for increased ease with all activities  Status at [facility]: frequent right arm pain and paresthesia mike with work related postures during surgery  Current: Reduced radicular symptoms     4. Patient reports reduction of pain to or= 3/10 with all recreational and work activities  Status at [facility]: pain 1-7/10  Current: No pain today, just tension     5. Improved SB and Rotation by >or= 10 deg for increased ease with head turns while driving  Status at [facility]: SB 20 deg , Rot 40 deg with right CS pain  Current: Improving 8-1-19        Long Term Goals: To be accomplished in 6 weeks:               1. Improved FOTO score to >or= 69/100 as evidence of improved function in regards to head turns when driving, work related activities and ADL  Status at [facility]: FOTO 59/100  Will assess at visit 5     2. Ability to look up at a bird or sherrie evidenced by increased CS ROM to >o= 60 deg  Status at [facility]: Ext 50 deg with pain right CS  Current: Improving 8-1-19     3. Improved postural strength to allow  Proper alignment during all activities to minimize CS strain  Status at [facility]: deficit in postural strength, habitual FHP  Current: Improving 8-1-19       PLAN  []  Upgrade activities as tolerated     [x]  Continue plan of care  []  Update interventions per flow sheet       []  Discharge due to:_  []  Other:_      Catalina Reed PTA 8/1/2019  2:01 PM    Future Appointments   Date Time Provider Oz Pelaez   8/6/2019  1:45 PM Miguel Ángel Brandon [facility]   8/8/2019  2:45 PM Shay Moreno PTA [facility]   8/13/2019  1:30 PM Shay Moreno PTA [facility]

## 2019-08-06 ENCOUNTER — HOSPITAL ENCOUNTER (OUTPATIENT)
Dept: PHYSICAL THERAPY | Age: 49
Discharge: HOME | End: 2019-08-06
Payer: COMMERCIAL

## 2019-08-06 PROCEDURE — 97112 NEUROMUSCULAR REEDUCATION: CPT

## 2019-08-06 PROCEDURE — 97140 MANUAL THERAPY 1/> REGIONS: CPT

## 2019-08-06 NOTE — PROGRESS NOTES
PT DAILY TREATMENT NOTE    Patient Name: Magda Ferreira  Date:2019  : 1970  [x]  Patient  Verified  Payor: Harjeet Nunes / Plan: Alisha Captain / Product Type: [employer] /    In time:150  Out time:300  Total Treatment Time (min): 70  Total Timed Codes (min): 70  1:1 Treatment Time ( [address] only): 70   Visit #: 5 of 12    Treatment Area: Cervical radiculopathy [M54.12]    SUBJECTIVE  Pain Level (0-10 scale): 0/10  Any medication changes, allergies to medications, adverse drug reactions, diagnosis change, or new procedure performed?: [x] No    [] Yes (see summary sheet for update)  Subjective functional status/changes:   [] No changes reported  Pt reports that she is trying to incorporate everything into her schedule but doesn't always sleep well still and hasn't called for the ergonomic assessment. She states that she no longer gets radicular symptoms and pain.     OBJECTIVE    Modalities Rationale:     decrease pain and increase tissue extensibility to improve patient's ability to complete surgical day   min [] Estim, type/location:                                      []  att     []  unatt     []  w/US     []  w/ice    []  w/heat    min []  Mechanical Traction: type/lbs                   []  pro   []  sup   []  int   []  cont    []  before manual    []  after manual    min []  Ultrasound, settings/location:      min []  Iontophoresis w/ dexamethasone, location:                                               []  take home patch       []  in clinic   With treatment min []  Ice     [x]  Heat    location/position: Supine, abdomen    min []  Vasopneumatic Device, press/temp:     min []  Other:    [x] Skin assessment post-treatment (if applicable):    [x]  intact    [x]  redness- no adverse reaction     []redness - adverse reaction:           30 min Neuromuscular Re-education:  []  See flow sheet :restorative yoga, diaphragmatic breathing   Rationale: increase ROM, improve coordination and increase proprioception  to improve the patients ability to complete surgical day    40 min Manual Therapy:  SOR, DTm to scalenes and SCM, MFR to bilateral shoulders   Rationale: decrease pain, increase ROM, increase tissue extensibility, decrease edema  and decrease trigger points to improve the patients ability to complete surgical day          With   [] TE   [] TA   [x] neuro   [] other: Patient Education: [x] Review HEP    [] Progressed/Changed HEP based on:   [x] positioning   [x] body mechanics   [] transfers   [] heat/ice application    [] other:      Other Objective/Functional Measures:      Pain Level (0-10 scale) post treatment: 0/10    ASSESSMENT/Changes in Function: Pt demonstrates improved postural alignment, breathing pattern and neck AROM. Pt continues to have ms tension and forward neck and trunk due to weakness and chronic strain from work environment. Added restorative yoga for home use to help address postural issues as well as overactivation of sympathetic nervous system    Patient will continue to benefit from skilled PT services to address functional mobility deficits, address ROM deficits, address strength deficits, analyze and address soft tissue restrictions, analyze and cue movement patterns, analyze and modify body mechanics/ergonomics and assess and modify postural abnormalities to attain remaining goals. []  See Plan of Care  []  See progress note/recertification  []  See Discharge Summary         Progress towards goals / Updated goals:  Short Term Goals: To be accomplished in 3 weeks:               1. Patient has good understanding of correct postural alignment and breathing pattern to allow self correction during ADL/work  Status at [facility] causing chronic strain to cervicothoracic junction, altered breathing pattern associated with neck tension /compensatory use of neck musculature  Current: Improving     2.  Patient reports reduction of pain to <or= 5/10 with all ROM and ADL  Status at [facility]: pain ranging 1-7/10, intermittent right arm pain ( C6 )  Current: Nearly resolved, tension only 8-6-19     3. Reduction in radicular right arm symptoms by >or= 50% for increased ease with all activities  Status at [facility]: frequent right arm pain and paresthesia mike with work related postures during surgery  Current: RESOLVED without radicular symptoms     4. Patient reports reduction of pain to or= 3/10 with all recreational and work activities  Status at [facility]: pain 1-7/10  Current: [place] 8-6-19     5. Improved SB and Rotation by >or= 10 deg for increased ease with head turns while driving  Status at [facility]: SB 20 deg , Rot 40 deg with right CS pain  Current: Improving 8-1-19        Long Term Goals: To be accomplished in 6 weeks:               1. Improved FOTO score to >or= 69/100 as evidence of improved function in regards to head turns when driving, work related activities and ADL  Status at [facility]: FOTO 59/100  Will assess at next visit     2. Ability to look up at a bird or sherrie evidenced by increased CS ROM to >o= 60 deg  Status at [facility]: Ext 50 deg with pain right CS  Current: Improving 8-1-19     3. Improved postural strength to allow  Proper alignment during all activities to minimize CS strain  Status at [facility]: deficit in postural strength, habitual FHP  Current: Improving 8-1-19    PLAN  [x]  Upgrade activities as tolerated     [x]  Continue plan of care  []  Update interventions per flow sheet       []  Discharge due to:_  []  Other:_      Otto Prince PTA 8/6/2019  1:09 PM    Future Appointments   Date Time Provider Oz Pelaez   8/6/2019  1:45 PM Fabiano Oscar [facility]   8/8/2019  2:45 PM Doug Mishra PTA [facility]   8/13/2019  1:30 PM Doug Mishra PTA [facility]

## 2019-08-06 NOTE — PROGRESS NOTES
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 Patient has good understanding of correct postural alignment and breathing pattern to allow self correction during ADL/work  Status at [facility]: [facility] causing chronic strain to cervicothoracic junction, altered breathing pattern associated with neck tension /compensatory use of neck musculature  Current: Improving     2. Patient reports reduction of pain to <or= 5/10 with all ROM and ADL  Status at [facility]: pain ranging 1-7/10, intermittent right arm pain ( C6 )  Current: Nearly resolved, tension only 19     3. Reduction in radicular right arm symptoms by >or= 50% for increased ease with all activities  Status at [facility]: frequent right arm pain and paresthesia mike with work related postures during surgery  Current: RESOLVED without radicular symptoms     4. Patient reports reduction of pain to or= 3/10 with all recreational and work activities  Status at [facility]: pain 1-7/10  Current: [place] 19     5. Improved SB and Rotation by >or= 10 deg for increased ease with head turns while driving  Status at [facility]: SB 20 deg , Rot 40 deg with right CS pain  Current: Improving 19        Long Term Goals: To be accomplished in 6 weeks:               1.  Improved FOTO score to >or= 69/100 as evidence of improved function in regards to head turns when driving, work related activities and ADL  Status at [facility]: FOTO 59/100  Will assess at next visit     2. Ability to look up at a bird or sherrie evidenced by increased CS ROM to >o= 60 deg  Status at [facility]: Ext 50 deg with pain right CS  Current: Improving 8-1-19     3. Improved postural strength to allow  Proper alignment during all activities to minimize CS strain  Status at [facility]: deficit in postural strength, habitual FHP  Current: Improving 8-1-19        Key Functional Changes: Improved CROM, Improved diaphragmatic breathing, resolved radicular symptoms, nearly resolved pain, improved postural alignment  Updated Goals: To be achieved in 4 weeks:    Pt has made nice progress however chronic poor posture and work environment contributes to ongoing symptoms.   Pt will benefit from continued treatment to address ongoing issues    ASSESSMENT/RECOMMENDATIONS:  [x]Continue therapy per initial plan/protocol at a frequency of  2 x per week for 4 weeks  []Continue therapy with the following recommended changes:   _____________________ _____________________________ _________________________________  []Discontinue therapy progressing towards or have reached established goals  []Discontinue therapy due to lack of appreciable progress towards goals  []Discontinue therapy due to lack of attendance or compliance  []Await Physician's recommendations/decisions regarding therapy  []Other:   _____________________ _____________________________ _________________________________  Thank you for this referral.     ANGIE Signature: Jaye Hill PTA  Date: 8/6/2019   PT Signature:  Time: 3:10 PM       NOTE TO PHYSICIAN:  PLEASE COMPLETE THE ORDERS BELOW AND   FAX TO [facility]: [phone]  If you are unable to process this request in 24 hours please contact our office: [phone]      ____ I have read the above report and request that my patient continue therapy with the following changes/special instructions:  ____ I have read the above report and request that my patient be discharged from therapy    Physician's Signature:____________Date:_________TIME:________    ** Signature, Date and Time must be completed for valid certification **

## 2019-08-08 ENCOUNTER — HOSPITAL ENCOUNTER (OUTPATIENT)
Dept: PHYSICAL THERAPY | Age: 49
Discharge: HOME | End: 2019-08-08
Payer: COMMERCIAL

## 2019-08-08 PROCEDURE — 97112 NEUROMUSCULAR REEDUCATION: CPT

## 2019-08-08 PROCEDURE — 97140 MANUAL THERAPY 1/> REGIONS: CPT

## 2019-08-08 NOTE — PROGRESS NOTES
PT DAILY TREATMENT NOTE    Patient Name: Jaron Morales  Date:2019  : 1970  [x]  Patient  Verified  Payor: Raad Nieves / Plan: Elvira Morrison / Product Type: [employer] /    In [employer] time:330  Total Treatment Time (min): 45  Total Timed Codes (min): 45  1:1 Treatment Time ( [address] only): 39   Visit #: 6 of 12    Treatment Area: Cervical radiculopathy [M54.12]    SUBJECTIVE  Pain Level (0-10 scale): 0/10  Any medication changes, allergies to medications, adverse drug reactions, diagnosis change, or new procedure performed?: [x] No    [] Yes (see summary sheet for update)  Subjective functional status/changes:   [] No changes reported  Pt reports that she is feeling very stressed.   She does report completing some restorative yoga at home to help reduce her stress levels    OBJECTIVE    Modalities Rationale:     decrease pain and increase tissue extensibility to improve patient's ability to complete surgical day   min [] Estim, type/location:                                      []  att     []  unatt     []  w/US     []  w/ice    []  w/heat    min []  Mechanical Traction: type/lbs                   []  pro   []  sup   []  int   []  cont    []  before manual    []  after manual    min []  Ultrasound, settings/location:      min []  Iontophoresis w/ dexamethasone, location:                                               []  take home patch       []  in clinic   With treatment min []  Ice     [x]  Heat    location/position: Elevated abdomen    min []  Vasopneumatic Device, press/temp:     min []  Other:    [x] Skin assessment post-treatment (if applicable):    [x]  intact    [x]  redness- no adverse reaction     []redness - adverse reaction:           15 min Neuromuscular Re-education:  []  See flow sheet :elevated bound angle, restorative yoga, diaphragmatic breathing   Rationale: increase ROM, improve coordination and increase proprioception  to improve the patients ability to complete surgical day    30 min Manual Therapy:SOR, DTM to scalenes, SCM, MFR to bilateral shoulders, myofascial arm pulls bilaterally   Rationale: decrease pain, increase ROM, increase tissue extensibility, decrease edema  and decrease trigger points to improve the patients ability to complete surgical day            With   [] TE   [] TA   [x] neuro   [] other: Patient Education: [x] Review HEP    [] Progressed/Changed HEP based on:   [x] positioning   [x] body mechanics   [] transfers   [] heat/ice application    [] other:      Other Objective/Functional Measures:     Pain Level (0-10 scale) post treatment:0/10    ASSESSMENT/Changes in Function: Pt demonstrates continued tone though neck musculature however with resolved radicular symptoms and nearly resolved pain. Pt has been more consistent with self care and stress  Management program as well as restorative yoga to address neck tension. Patient will continue to benefit from skilled PT services to address functional mobility deficits, address ROM deficits, address strength deficits, analyze and address soft tissue restrictions, analyze and cue movement patterns, analyze and modify body mechanics/ergonomics and assess and modify postural abnormalities to attain remaining goals. []  See Plan of Care  []  See progress note/recertification  []  See Discharge Summary         Progress towards goals / Updated goals:  Short Term Goals: To be accomplished in 3 weeks:               1. Patient has good understanding of correct postural alignment and breathing pattern to allow self correction during ADL/work  Status at [facility]: [facility] causing chronic strain to cervicothoracic junction, altered breathing pattern associated with neck tension /compensatory use of neck musculature  Current: Improving     2.  Patient reports reduction of pain to <or= 5/10 with all ROM and ADL  Status at [facility]: pain ranging 1-7/10, intermittent right arm pain ( C6 )  Current: Nearly resolved, tension only 8-6-19     3. Reduction in radicular right arm symptoms by >or= 50% for increased ease with all activities  Status at [facility]: frequent right arm pain and paresthesia mike with work related postures during surgery  Current: RESOLVED without radicular symptoms     4. Patient reports reduction of pain to or= 3/10 with all recreational and work activities  Status at Eval: pain 1-7/10  Current: [place] 8-6-19     5. Improved SB and Rotation by >or= 10 deg for increased ease with head turns while driving  Status at Eval: SB 20 deg , Rot 40 deg with right CS pain  Current: Improving 8-1-19        Long Term Goals: To be accomplished in 6 weeks:               1. Improved FOTO score to >or= 69/100 as evidence of improved function in regards to head turns when driving, work related activities and ADL  Status at [facility]: FOTO 59/100  Will assess at next visit     2. Ability to look up at a bird or sherrie evidenced by increased CS ROM to >o= 60 deg  Status at Eval: Ext 50 deg with pain right CS  Current: Improving 8-1-19     3. Improved postural strength to allow  Proper alignment during all activities to minimize CS strain  Status at Eval: deficit in postural strength, habitual FHP  Current: Improving 8-1-19    PLAN  []  Upgrade activities as tolerated     []  Continue plan of care  []  Update interventions per flow sheet       []  Discharge due to:_  []  Other:_      Danette Fitzpatrick PTA 8/8/2019  1:40 PM    Future Appointments   Date Time Provider Oz Pelaez   8/8/2019  2:45 PM Manoj Whiting [facility]   8/13/2019  1:30 PM Manoj Whiting [facility]   8/15/2019  3:00 PM Manoj Whiting PTA [facility]   8/21/2019  4:45 PM Manoj Whiting [facility]   8/29/2019  3:30 PM Manoj Whiting [facility]

## 2019-08-13 ENCOUNTER — HOSPITAL ENCOUNTER (OUTPATIENT)
Dept: PHYSICAL THERAPY | Age: 49
Discharge: HOME | End: 2019-08-13
Payer: COMMERCIAL

## 2019-08-13 PROCEDURE — 97140 MANUAL THERAPY 1/> REGIONS: CPT

## 2019-08-13 PROCEDURE — 97530 THERAPEUTIC ACTIVITIES: CPT

## 2019-08-13 NOTE — PROGRESS NOTES
PT DAILY TREATMENT NOTE    Patient Name: Russ Hylton  Date:2019  : 1970  [x]  Patient  Verified  Payor: Nicky Helm / Plan: Alden Blackburn / Product Type: [employer] /    In [employer] time:245  Total Treatment Time (min): 68  Total Timed Codes (min): 68  1:1 Treatment Time ( [address] only): 68   Visit #: 7 of 12    Treatment Area: Cervical radiculopathy [M54.12]    SUBJECTIVE  Pain Level (0-10 scale): 0/10 stiff and restricted  Any medication changes, allergies to medications, adverse drug reactions, diagnosis change, or new procedure performed?: [x] No    [] Yes (see summary sheet for update)  Subjective functional status/changes:   [] No changes reported  Pt reports that she did surgery for 16 hours yesterday. She states that she isn't having any pain but feels stiff and less mobile    OBJECTIVE      10 min Therapeutic Activity:  []  See flow sheet :self care, stress management, HEP   Rationale: increase ROM, improve coordination and increase proprioception  to improve the patients ability to complete surgical day       58 min Manual Therapy:  SOR, DTm to scalenes, SCM, rhomboids, myofascial arm pulls, CROM,    Rationale: decrease pain, increase ROM, increase tissue extensibility, decrease edema  and decrease trigger points to improve the patients ability to complete surgical day          With   [] TE   [x] TA   [] neuro   [x] other: Patient Education: [x] Review HEP    [] Progressed/Changed HEP based on:   [x] positioning   [x] body mechanics   [] transfers   [] heat/ice application    [] other:      Other Objective/Functional Measures:       Pain Level (0-10 scale) post treatment: 0/10    ASSESSMENT/Changes in Function:  Pt demonstrates improved soft tissue mobility with improved CROM and absence of pain following a 16 hour surgical day. She has not been able to consistently practice HEP however overall notes resolution of pain.     Patient will continue to benefit from skilled PT services to address functional mobility deficits, address ROM deficits, address strength deficits, analyze and address soft tissue restrictions, analyze and cue movement patterns, analyze and modify body mechanics/ergonomics and assess and modify postural abnormalities to attain remaining goals. []  See Plan of Care  []  See progress note/recertification  []  See Discharge Summary         Progress towards goals / Updated goals:  Short Term Goals: To be accomplished in 3 weeks:               1. Patient has good understanding of correct postural alignment and breathing pattern to allow self correction during ADL/work  Status at [facility]: [facility] causing chronic strain to cervicothoracic junction, altered breathing pattern associated with neck tension /compensatory use of neck musculature  Current: Improving     2. Patient reports reduction of pain to <or= 5/10 with all ROM and ADL  Status at [facility]: pain ranging 1-7/10, intermittent right arm pain ( C6 )  Current: Nearly resolved, tension only 8-6-19     3. Reduction in radicular right arm symptoms by >or= 50% for increased ease with all activities  Status at [facility]: frequent right arm pain and paresthesia mike with work related postures during surgery  Current: RESOLVED without radicular symptoms     4. Patient reports reduction of pain to or= 3/10 with all recreational and work activities  Status at [facility]: pain 1-7/10  Current:RESOLVED absence of pain 8-13-19     5. Improved SB and Rotation by >or= 10 deg for increased ease with head turns while driving  Status at [facility]: SB 20 deg , Rot 40 deg with right CS pain  Current: [facility] 8-13-19        Long Term Goals: To be accomplished in 6 weeks:               1. Improved FOTO score to >or= 69/100 as evidence of improved function in regards to head turns when driving, work related activities and ADL  Status at [facility]: FOTO 59/100  Will assess at next visit     2.  Ability to look up at a bird or sherrie evidenced by increased CS ROM to >o= 60 deg  Status at Eval: Ext 50 deg with pain right CS  Current: Improving 8-1-19     3. Improved postural strength to allow  Proper alignment during all activities to minimize CS strain  Status at Eval: deficit in postural strength, habitual FHP  Current: Improving 8-1-19       PLAN  []  Upgrade activities as tolerated     []  Continue plan of care  []  Update interventions per flow sheet       []  Discharge due to:_  []  Other:_      Mimi Pearson PTA 8/13/2019  1:25 PM    Future Appointments   Date Time Provider Oz Pelaez   [facility]13[facility]2019  [facility]   [facility]2019  [facility]   8/21/2019  [facility]   8/29/2019  3:30 [facility]

## 2019-08-15 ENCOUNTER — HOSPITAL ENCOUNTER (OUTPATIENT)
Dept: PHYSICAL THERAPY | Age: 49
Discharge: HOME | End: 2019-08-15
Payer: COMMERCIAL

## 2019-08-15 PROCEDURE — 97112 NEUROMUSCULAR REEDUCATION: CPT

## 2019-08-15 PROCEDURE — 97140 MANUAL THERAPY 1/> REGIONS: CPT

## 2019-08-15 NOTE — PROGRESS NOTES
PT DAILY TREATMENT NOTE    Patient Name: Sara Méndez  Date:8/15/2019  : 1970  [x]  Patient  Verified  Payor: Neftali Galvan / Plan: Prosper Vizcaino / Product Type: [employer] /    In [employer]  Out time:200  Total Treatment Time (min): 75  Total Timed Codes (min): 75  1:1 Treatment Time ( [address] only): 75   Visit #: 8 of 12    Treatment Area: Cervical radiculopathy [M54.12]    SUBJECTIVE  Pain Level (0-10 scale): 0/10  Any medication changes, allergies to medications, adverse drug reactions, diagnosis change, or new procedure performed?: [x] No    [] Yes (see summary sheet for update)  Subjective functional status/changes:   [] No changes reported  Pt reports that she ended up in a negative situation that led her to feel really tight.     OBJECTIVE    Modalities Rationale:     decrease pain and increase tissue extensibility to improve patient's ability to complete surgical day without pain   min [] Estim, type/location:                                      []  att     []  unatt     []  w/US     []  w/ice    []  w/heat    min []  Mechanical Traction: type/lbs                   []  pro   []  sup   []  int   []  cont    []  before manual    []  after manual    min []  Ultrasound, settings/location:      min []  Iontophoresis w/ dexamethasone, location:                                               []  take home patch       []  in clinic   With treatment min []  Ice     [x]  Heat    location/position: Abdomen, supine    min []  Vasopneumatic Device, press/temp:     min []  Other:    [x] Skin assessment post-treatment (if applicable):    [x]  intact    [x]  redness- no adverse reaction     []redness - adverse reaction:             30 min Neuromuscular Re-education:  []  See flow sheet :restorativeyoga, diaphragmatic breathing   Rationale: increase ROM, increase strength, improve coordination and increase proprioception  to improve the patients ability to complete surgical day    45 min Manual Therapy:SOR, DTM to scalenes and SCM, myofascial arm pulls,    Rationale: decrease pain, increase ROM, increase tissue extensibility, decrease edema  and decrease trigger points to improve the patients ability to complete surgical day          With   [x] TE   [] TA   [] neuro   [] other: Patient Education: [x] Review HEP    [] Progressed/Changed HEP based on:   [x] positioning   [x] body mechanics   [] transfers   [] heat/ice application    [] other:      Other Objective/Functional Measures:       Pain Level (0-10 scale) post treatment: 0/10    ASSESSMENT/Changes in Function:Pt demonstrates significant improvement with neck ROM into side bend and lateral rotation. She notes nearly resolved pain however continued stiffness and poor strength in upper thoracic region. Patient will continue to benefit from skilled PT services to address functional mobility deficits, address ROM deficits, address strength deficits, analyze and address soft tissue restrictions, analyze and cue movement patterns, analyze and modify body mechanics/ergonomics and assess and modify postural abnormalities to attain remaining goals. []  See Plan of Care  []  See progress note/recertification  []  See Discharge Summary         Progress towards goals / Updated goals:  Short Term Goals: To be accomplished in 3 weeks:               1. Patient has good understanding of correct postural alignment and breathing pattern to allow self correction during ADL/work  Status at [facility]: [facility] causing chronic strain to cervicothoracic junction, altered breathing pattern associated with neck tension /compensatory use of neck musculature  Current: Improving     2. Patient reports reduction of pain to <or= 5/10 with all ROM and ADL  Status at [facility]: pain ranging 1-7/10, intermittent right arm pain ( C6 )  Current: Nearly resolved, tension only 8-6-19     3.  Reduction in radicular right arm symptoms by >or= 50% for increased ease with all activities  Status at Eval: frequent right arm pain and paresthesia mike with work related postures during surgery  Current: RESOLVED without radicular symptoms     4. Patient reports reduction of pain to or= 3/10 with all recreational and work activities  Status at Eval: pain 1-7/10  Current:RESOLVED absence of pain 8-13-19     5. Improved SB and Rotation by >or= 10 deg for increased ease with head turns while driving  Status at Eval: SB 20 deg , Rot 40 deg with right CS pain  Current: [place] 8-13-19        Long Term Goals: To be accomplished in 6 weeks:               1. Improved FOTO score to >or= 69/100 as evidence of improved function in regards to head turns when driving, work related activities and ADL  Status at Eval: FOTO 59/100  Will assess at next visit     2. Ability to look up at a bird or sherrie evidenced by increased CS ROM to >o= 60 deg  Status at Eval: Ext 50 deg with pain right CS  Current: Improving 8-1-19     3. Improved postural strength to allow  Proper alignment during all activities to minimize CS strain  Status at Eval: deficit in postural strength, habitual FHP  Current: Improving 8-1-19    PLAN  [x]  Upgrade activities as tolerated     [x]  Continue plan of care  []  Update interventions per flow sheet       []  Discharge due to:_  []  Other:_      Elena Sr PTA 8/15/2019  1:49 PM    Future Appointments   Date Time Provider Oz Pelaez   8/21/2019  4:45 PM [facility]   8/29/2019  3:30 PM Norman Jameson PTA [facility]

## 2019-08-21 ENCOUNTER — HOSPITAL ENCOUNTER (OUTPATIENT)
Dept: PHYSICAL THERAPY | Age: 49
Discharge: HOME | End: 2019-08-21
Payer: COMMERCIAL

## 2019-08-21 NOTE — PROGRESS NOTES
PT DAILY TREATMENT NOTE    Patient Name: Kathia Arnold  Date:2019  : 1970  [x]  Patient  Verified  Payor: Cresencio Perez / Plan: Lidia Shanks / Product Type: [employer] /    In time:***  Out time:***  Total Treatment Time (min): ***  Total Timed Codes (min): ***  1:1 Treatment Time ( only): ***   Visit #: *** of ***    Treatment Area: Cervical radiculopathy [M54.12]    SUBJECTIVE  Pain Level (0-10 scale): ***  Any medication changes, allergies to medications, adverse drug reactions, diagnosis change, or new procedure performed?: [x] No    [] Yes (see summary sheet for update)  Subjective functional status/changes:   [] No changes reported  ***    OBJECTIVE    Modalities Rationale:     {[facility] INMOTION MODALITIES:54190} to improve patient's ability to ***   min [] Estim, type/location:                                      []  att     []  unatt     []  w/US     []  w/ice    []  w/heat    min []  Mechanical Traction: type/lbs                   []  pro   []  sup   []  int   []  cont    []  before manual    []  after manual    min []  Ultrasound, settings/location:      min []  Iontophoresis w/ dexamethasone, location:                                               []  take home patch       []  in clinic    min []  Ice     []  Heat    location/position:     min []  Vasopneumatic Device, press/temp:     min []  Other:    [] Skin assessment post-treatment (if applicable):    []  intact    []  redness- no adverse reaction     []redness  adverse reaction:        *** min []Eval                  []Re-Eval       *** min Therapeutic Exercise:  [] See flow sheet :   Rationale: {[facility] IMMOTION THER EX:86413} to improve the patients ability to ***    *** min Therapeutic Activity:  []  See flow sheet :   Rationale: {[facility] IMMOTION THER EX:21887}  to improve the patients ability to ***     *** min Neuromuscular Re-education:  []  See flow sheet :   Rationale: {[facility] IMMOTION THER EX:36772}  to improve the patients ability to ***    *** min Manual Therapy:  ***   Rationale: {[facility] IMMOTION MANUAL THERAPY:48841} to improve the patients ability to ***     min Gait Training:  ___ feet with ___ device on level surfaces with ___ level of assistance   Rationale: To improve ambulation safety and efficiency in order to improve patient's ability to safely ambulate at home for self care. min Self Care:    Rationale:    {[facility] IMMOTION THER EX:09710} to improve the patients ability to ***          With   [] TE   [] TA   [] neuro   [] other: Patient Education: [x] Review HEP    [] Progressed/Changed HEP based on:   [] positioning   [] body mechanics   [] transfers   [] heat/ice application    [] other:      Other Objective/Functional Measures: ***     Pain Level (0-10 scale) post treatment: ***    ASSESSMENT/Changes in Function: ***    Patient will continue to benefit from skilled PT services to {[facility] INMOTION ASSESSMENT STATEMENTS:20159} to attain remaining goals. []  See Plan of Care  []  See progress note/recertification  []  See Discharge Summary         Progress towards goals / Updated goals:  Short Term Goals: To be accomplished in 3 weeks:               1. Patient has good understanding of correct postural alignment and breathing pattern to allow self correction during ADL/work  Status at [facility]: [facility] causing chronic strain to cervicothoracic junction, altered breathing pattern associated with neck tension /compensatory use of neck musculature  Current: Improving     2. Patient reports reduction of pain to <or= 5/10 with all ROM and ADL  Status at [facility]: pain ranging 1-7/10, intermittent right arm pain ( C6 )  Current: Nearly resolved, tension only 8-6-19     3.  Reduction in radicular right arm symptoms by >or= 50% for increased ease with all activities  Status at [facility]: frequent right arm pain and paresthesia mike with work related postures during surgery  Current: RESOLVED without radicular symptoms     4. Patient reports reduction of pain to or= 3/10 with all recreational and work activities  Status at Eval: pain 1-7/10  Current:RESOLVED absence of pain 8-13-19     5. Improved SB and Rotation by >or= 10 deg for increased ease with head turns while driving  Status at Eval: SB 20 deg , Rot 40 deg with right CS pain  Current: [facility] 8-13-19        Long Term Goals: To be accomplished in 6 weeks:               1. Improved FOTO score to >or= 69/100 as evidence of improved function in regards to head turns when driving, work related activities and ADL  Status at Eval: FOTO 59/100  Will assess at next visit     2. Ability to look up at a bird or sherrie evidenced by increased CS ROM to >o= 60 deg  Status at Eval: Ext 50 deg with pain right CS  Current: Improving 8-1-19     3. Improved postural strength to allow  Proper alignment during all activities to minimize CS strain  Status at Eval: deficit in postural strength, habitual FHP  Current: Improving 8-1-19       PLAN  []  Upgrade activities as tolerated     []  Continue plan of care  []  Update interventions per flow sheet       []  Discharge due to:_  []  Other:_      Hilary Valdez PTA 8/21/2019  3:41 PM    Future Appointments   Date Time Provider Oz Pelaez   8/21/2019  4:45 PM Chente Dupree [facility]   8/29/2019  3:30 PM Nicole Bass PTA [facility]

## 2019-08-29 ENCOUNTER — HOSPITAL ENCOUNTER (OUTPATIENT)
Dept: PHYSICAL THERAPY | Age: 49
End: 2019-08-29
Payer: COMMERCIAL

## 2019-09-26 ENCOUNTER — HOSPITAL ENCOUNTER (OUTPATIENT)
Dept: PHYSICAL THERAPY | Age: 49
Discharge: HOME | End: 2019-09-26
Payer: COMMERCIAL

## 2019-09-26 PROCEDURE — 97530 THERAPEUTIC ACTIVITIES: CPT

## 2019-09-26 PROCEDURE — 97112 NEUROMUSCULAR REEDUCATION: CPT

## 2019-09-26 PROCEDURE — 97140 MANUAL THERAPY 1/> REGIONS: CPT

## 2019-09-26 NOTE — PROGRESS NOTES
PT DAILY TREATMENT NOTE    Patient Name: Edvin Kelly  Date:2019  : 1970  [x]  Patient  Verified  Payor: Bryant Branch / Plan: Wiliam Vang / Product Type: [employer] /    In time:355  Out time:455  Total Treatment Time (min): 60  Total Timed Codes (min): 60  1:1 Treatment Time ( [address] only): 60   Visit #: 9 of16    Treatment Area: Cervical radiculopathy [M54.12]    SUBJECTIVE  Pain Level (0-10 scale): 4-5/10  Any medication changes, allergies to medications, adverse drug reactions, diagnosis change, or new procedure performed?: [x] No    [] Yes (see summary sheet for update)  Subjective functional status/changes:   [] No changes reported  Pt reports that she had 18 days of straight working with poor self care. She states that she really was doing better but she hasn't gotten worse since.     OBJECTIVE    Modalities Rationale:     decrease pain and increase tissue extensibility to improve patient's ability to complete work daymm   min [] Estim, type/location:                                      []  att     []  unatt     []  w/US     []  w/ice    []  w/heat    min []  Mechanical Traction: type/lbs                   []  pro   []  sup   []  int   []  cont    []  before manual    []  after manual    min []  Ultrasound, settings/location:      min []  Iontophoresis w/ dexamethasone, location:                                               []  take home patch       []  in clinic   With treatment min []  Ice     [x]  Heat    location/position: Restorative yoga abdomen    min []  Vasopneumatic Device, press/temp:     min []  Other:    [x] Skin assessment post-treatment (if applicable):    [x]  intact    [x]  redness- no adverse reaction     []redness - adverse reaction:       20 min Neurological Retraining Restorative yoga to reduce muscle tone and improve diaphragmatic breathing    40 min Manual Therapy:  SOR, DTM to lev scap, scalenes, SCM, myofasical arm pulls bilaterally, MFR to neck Rationale: decrease pain, increase ROM, increase tissue extensibility, decrease edema  and decrease trigger points to improve the patients ability to complete work day          With   [x] TE   [] TA   [] neuro   [] other: Patient Education: [x] Review HEP    [] Progressed/Changed HEP based on:   [x] positioning   [x] body mechanics   [] transfers   [] heat/ice application    [] other:      Other Objective/Functional Measures     Pain Level (0-10 scale) post treatment: 4/10    ASSESSMENT/Changes in Function: Pt demonstrates increased tone with poor mobility throughout upper torso, cervical region. She demonstrates improved mobility following manual work with reduced pain. Patient will continue to benefit from skilled PT services to address functional mobility deficits, address ROM deficits, address strength deficits, analyze and address soft tissue restrictions, analyze and cue movement patterns, analyze and modify body mechanics/ergonomics and assess and modify postural abnormalities to attain remaining goals. []  See Plan of Care  []  See progress note/recertification  []  See Discharge Summary         Progress towards goals / Updated goals:  Short Term Goals: To be accomplished in 3 weeks:               1. Patient has good understanding of correct postural alignment and breathing pattern to allow self correction during ADL/work  Status at [facility]: [facility] causing chronic strain to cervicothoracic junction, altered breathing pattern associated with neck tension /compensatory use of neck musculature  Current: Improving postural awareness , inconsistent use of breathing techniques, progressing     2.  Patient reports reduction of pain to <or= 5/10 with all ROM and ADL  Status at [facility]: pain ranging 1-7/10, intermittent right arm pain ( C6 )  Current: Nearly resolved, tension only 8-6-19  Status on 9/26/19: pain ranging up to 6/10, bilateral neck shoulder tension due to sustained work load and stress, regressed     3. Reduction in radicular right arm symptoms by >or= 50% for increased ease with all activities  Status at [facility]: frequent right arm pain and paresthesia mike with work related postures during surgery  Current: RESOLVED without radicular symptoms  Status on 9/26/19: 100% reduction of arm pain , goal met     4. Patient reports reduction of pain to or= 3/10 with all recreational and work activities  Status at [address] [place] [address] Court: pain 1-7/10  Current:RESOLVED absence of pain 8-13-19  Status on 9/26/19: pain ranging up to 6/10 max at end of work day, regressed     5. Improved SB and Rotation by >or= 10 deg for increased ease with head turns while driving  Status at [facility]: SB 20 deg , Rot 40 deg with right CS pain  Current: MET 8-13-19  Status on 9/26/19: Flex 30 with stretching/pulling into right shoulder/arm, Ext 30 with pain , SB 20, Rot left 45, right 40, upper CS restriction, regressed        Long Term Goals: To be accomplished in 6 weeks:               1. Improved FOTO score to >or= 69/100 as evidence of improved function in regards to head turns when driving, work related activities and ADL  Status at [facility]: FOTO 59/100  Status on 9/26/19: FOTO 58/100, regressed     2. Ability to look up at a bird or sherrie evidenced by increased CS ROM to >o= 60 deg  Status at [facility]: Ext 50 deg with pain right CS  Current: Improving 8-1-19  Status on 9/26/19: active Ext restricted to 30 deg due to pain, regressed     3. Improved postural strength to allow  Proper alignment during all activities to minimize CS strain  Status at [facility]: deficit in postural strength, habitual FHP  Current: Improving 8-1-19  Status on 9/26/19: continues with deficits in postural strength, regressed       PLAN  []  Upgrade activities as tolerated     []  Continue plan of care  []  Update interventions per flow sheet       []  Discharge due to:_  []  Other:_      Axel Box, PTA 9/26/2019  4:39 PM    No future appointments.

## 2019-09-26 NOTE — PROGRESS NOTES
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 Molina Ambrose was showing steady improvement in function, CS mobility/flexibility and pain but has been unable to attend for multiple weeks due to her busy work schedule. As of her reevaluation on 19 she has regressed significantly and is presenting with increased pain/tightness, regression in CS ROM and functional deficits . I recommend to continue with current treatment on a weekly basis and proceed with ergonomic assessment to improve set up during prolonged surgery schedule. Progress towards goals / Updated goals:  Short Term Goals: To be accomplished in 3 weeks:               1. Patient has good understanding of correct postural alignment and breathing pattern to allow self correction during ADL/work  Status at [facility]: [facility] causing chronic strain to cervicothoracic junction, altered breathing pattern associated with neck tension /compensatory use of neck musculature  Current: Improving postural awareness , inconsistent use of breathing techniques, progressing     2.  Patient reports reduction of pain to <or= 5/10 with all ROM and ADL  Status at [facility]: pain ranging 1-7/10, intermittent right arm pain ( C6 )  Current: Nearly resolved, tension only 8-6-19  Status on 19: pain ranging up to 6/10, bilateral neck shoulder tension due to sustained work load and stress, regressed     3. Reduction in radicular right arm symptoms by >or= 50% for increased ease with all activities  Status at [facility]: frequent right arm pain and paresthesia mike with work related postures during surgery  Current: RESOLVED without radicular symptoms  Status on 9/26/19: 100% reduction of arm pain , goal met     4. Patient reports reduction of pain to or= 3/10 with all recreational and work activities  Status at [facility]: pain 1-7/10  Current:RESOLVED absence of pain 8-13-19  Status on 9/26/19: pain ranging up to 6/10 max at end of work day, regressed     5. Improved SB and Rotation by >or= 10 deg for increased ease with head turns while driving  Status at [facility]: SB 20 deg , Rot 40 deg with right CS pain  Current: MET 8-13-19  Status on 9/26/19: Flex 30 with stretching/pulling into right shoulder/arm, Ext 30 with pain , SB 20, Rot left 45, right 40, upper CS restriction, regressed        Long Term Goals: To be accomplished in 6 weeks:               1. Improved FOTO score to >or= 69/100 as evidence of improved function in regards to head turns when driving, work related activities and ADL  Status at [facility]: FOTO 59/100  Status on 9/26/19: FOTO 58/100, regressed     2. Ability to look up at a bird or sherrie evidenced by increased CS ROM to >o= 60 deg  Status at [facility]: Ext 50 deg with pain right CS  Current: Improving 8-1-19  Status on 9/26/19: active Ext restricted to 30 deg due to pain, regressed     3. Improved postural strength to allow  Proper alignment during all activities to minimize CS strain  Status at [facility]: deficit in postural strength, habitual FHP  Current: Improving 8-1-19  Status on 9/26/19: continues with deficits in postural strength, regressed    Key Functional Changes: reduction in radicular right arm pain 100%      ASSESSMENT/RECOMMENDATIONS:  [x]Continue therapy per initial plan/protocol at a frequency of  2 x per week for 4 weeks  []Continue therapy with the following recommended changes:_____________________ _____________________________ ________________________________________  []Discontinue therapy progressing towards or have reached established goals  []Discontinue therapy due to lack of appreciable progress towards goals  []Discontinue therapy due to lack of attendance or compliance  []Await Physician's recommendations/decisions regarding therapy  []Other:________________________________________________________________    Thank you for this referral.   Jane Delgado, PT 9/26/2019 4:01 PM    NOTE TO PHYSICIAN:  [address] AND   FAX TO [facility]: [phone]  If you are unable to process this request in 24 hours please contact our office: [phone]      ____ I have read the above report and request that my patient continue therapy with the following changes/special instructions:  ____ I have read the above report and request that my patient be discharged from therapy    Physician's Signature:____________Date:_________TIME:________    ** Signature, Date and Time must be completed for valid certification **

## 2019-09-26 NOTE — PROGRESS NOTES
PT DAILY TREATMENT NOTE    Patient Name: Rhonda Penny  Date:2019  : 1970  [x]  Patient  Verified  Payor: Delilah Metz / Plan: Arielle Berry / Product Type: [employer] /    In time:330  Out time:355  Total Treatment Time (min): 25  Total Timed Codes (min): 25  1:1 Treatment Time ( [address] only): n/a   Visit #: 9 of 12    Treatment Area: Cervical radiculopathy [M54.12]    SUBJECTIVE  Pain Level (0-10 scale): 4-5/10 CS with  Movement, 0/10 pain at rest  Any medication changes, allergies to medications, adverse drug reactions, diagnosis change, or new procedure performed?: [x] No    [] Yes (see summary sheet for update)  Subjective functional status/changes:   [] No changes reported  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 Initial improvement but regression lately. Feeling. Pain ranging up to 6/10 at end of work day. Overall lots of tension, restrictions. More guarding.  More aware of posture in general.         OBJECTIVE    25 min Therapeutic Activity: reevaluation,    Rationale: establish current status and further POC              With   [x] TE   [] TA   [] neuro   [] other: Patient Education: [x] Review HEP    [] Progressed/Changed HEP based on:   [x] positioning   [x] body mechanics   [] transfers   [] heat/ice application    [x] other:provided patient with phone contact to schedule ergonomic assessment with [employer]      Other Objective/Functional Measures:  See below for results, overall significant restriction in CS ROM, increased neck shoulder tension and increase in pain to 6/10 max  Discussed ergonomic assessment through [employer], patient was given contact info       Pain Level (0-10 scale) post treatment: 4-5/10    ASSESSMENT/Changes in Function:Pt demonstrates increased restrictions and tightness in CS as she has been unable to attend therapy 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 [x]  See Plan of Care  []  See progress note/recertification  []  See Discharge Summary         Progress towards goals / Updated goals:  Short Term Goals: To be accomplished in 3 weeks:               1. Patient has good understanding of correct postural alignment and breathing pattern to allow self correction during ADL/work  Status at [facility]: [facility] causing chronic strain to cervicothoracic junction, altered breathing pattern associated with neck tension /compensatory use of neck musculature  Current: Improving postural awareness , inconsistent use of breathing techniques, progressing     2. Patient reports reduction of pain to <or= 5/10 with all ROM and ADL  Status at [facility]: pain ranging 1-7/10, intermittent right arm pain ( C6 )  Current: Nearly resolved, tension only 8-6-19  Status on 9/26/19: pain ranging up to 6/10, bilateral neck shoulder tension due to sustained work load and stress, regressed     3. Reduction in radicular right arm symptoms by >or= 50% for increased ease with all activities  Status at [facility]: frequent right arm pain and paresthesia mike with work related postures during surgery  Current: RESOLVED without radicular symptoms  Status on 9/26/19: 100% reduction of arm pain , goal met     4. Patient reports reduction of pain to or= 3/10 with all recreational and work activities  Status at [employer]: pain 1-7/10  Current:RESOLVED absence of pain 8-13-19  Status on 9/26/19: pain ranging up to 6/10 max at end of work day, regressed     5.  Improved SB and Rotation by >or= 10 deg for increased ease with head turns while driving  Status at [facility]: SB 20 deg , Rot 40 deg with right CS pain  Current: MET 8-13-19  Status on 9/26/19: Flex 30 with stretching/pulling into right shoulder/arm, Ext 30 with pain , SB 20, Rot left 45, right 40, upper CS restriction, regressed        Long Term Goals: To be accomplished in 6 weeks:               1. Improved FOTO score to >or= 69/100 as evidence of improved function in regards to head turns when driving, work related activities and ADL  Status at [facility]: FOTO 59/100  Status on 9/26/19: FOTO 58/100, regressed     2. Ability to look up at a bird or sherrie evidenced by increased CS ROM to >o= 60 deg  Status at [facility]: Ext 50 deg with pain right CS  Current: Improving 8-1-19  Status on 9/26/19: active Ext restricted to 30 deg due to pain, regressed     3. Improved postural strength to allow  Proper alignment during all activities to minimize CS strain  Status at [facility]: deficit in postural strength, habitual FHP  Current: Improving 8-1-19  Status on 9/26/19: continues with deficits in postural strength, regressed    PLAN  [x]  Upgrade activities as tolerated     [x]  Continue plan of care to address remaining goals, advised patient to go through ergonomic assessment and attend PT on regular basis  []  Update interventions per flow sheet       []  Discharge due to:_  []  Other:_      Padmini Bernard, PT 9/26/2019  1:49 PM    Future Appointments   Date Time Provider Oz Pelaez   9/26/2019  4:00 PM Karyn Leavitt, [facility]

## 2019-10-08 ENCOUNTER — HOSPITAL ENCOUNTER (OUTPATIENT)
Dept: PHYSICAL THERAPY | Age: 49
Discharge: HOME | End: 2019-10-08
Payer: COMMERCIAL

## 2019-10-08 PROCEDURE — 97140 MANUAL THERAPY 1/> REGIONS: CPT

## 2019-10-08 PROCEDURE — 97110 THERAPEUTIC EXERCISES: CPT

## 2019-10-08 NOTE — PROGRESS NOTES
PT DAILY TREATMENT NOTE    Patient Name: Gianfranco Verdin  Date:10/8/2019  : 1970  [x]  Patient  Verified  Payor: Libia Reed / Plan: Matheus Carrasco / Product Type: [employer] /    In [place] time:503  Total Treatment Time (min): 48  Total Timed Codes (min): 38  1:1 Treatment Time ([facility] only): 38   Visit #: 10 of 17    Treatment Area: Cervical radiculopathy [M54.12]    SUBJECTIVE  Pain Level (0-10 scale): 7/10  Any medication changes, allergies to medications, adverse drug reactions, diagnosis change, or new procedure performed?: [x] No    [] Yes (see summary sheet for update)  Subjective functional status/changes:   [] No changes reported  Pt states that she has been trying to do better at home    OBJECTIVE    Modalities Rationale:     decrease pain and increase tissue extensibility to improve patient's ability to complete surgical day   min [] Estim, type/location:                                      []  att     []  unatt     []  w/US     []  w/ice    []  w/heat    min []  Mechanical Traction: type/lbs                   []  pro   []  sup   []  int   []  cont    []  before manual    []  after manual    min []  Ultrasound, settings/location:      min []  Iontophoresis w/ dexamethasone, location:                                               []  take home patch       []  in clinic   With treatment min []  Ice     [x]  Heat    location/position: Restorative inclined bound angle    min []  Vasopneumatic Device, press/temp:     min []  Other:    [x] Skin assessment post-treatment (if applicable):    [x]  intact    [x]  redness- no adverse reaction     []redness - adverse reaction:          23 min Therapeutic Exercise:  [] See flow sheet :   Rationale: increase ROM, increase strength, improve coordination, improve balance and increase proprioception to improve the patients ability to complete ADls      25 min Manual Therapy:SOR, myofascial arm pull, DTM to scalenes, upper trap, lev scap Rationale: decrease pain, increase ROM, increase tissue extensibility, decrease edema  and decrease trigger points to improve the patients ability to complete surgical day without pain          With   [] TE   [] TA   [] neuro   [] other: Patient Education: [x] Review HEP    [] Progressed/Changed HEP based on:   [] positioning   [] body mechanics   [] transfers   [x] heat/ice application    [] other: myofascial balls, yoga     Other Objective/Functional Measures:     Pain Level (0-10 scale) post treatment: 4/10    ASSESSMENT/Changes in Function: Pt demonstrates continued ms tension and forward head and neck. Added stretches and quadruped strengthening to promote improved mobility and function in surgical environement    Patient will continue to benefit from skilled PT services to address functional mobility deficits, address ROM deficits, address strength deficits, analyze and address soft tissue restrictions, analyze and cue movement patterns, analyze and modify body mechanics/ergonomics, assess and modify postural abnormalities and address imbalance/dizziness to attain remaining goals. []  See Plan of Care  []  See progress note/recertification  []  See Discharge Summary         Progress towards goals / Updated goals:  Short Term Goals: To be accomplished in 3 weeks:               1. Patient has good understanding of correct postural alignment and breathing pattern to allow self correction during ADL/work  Status at [facility]: [facility] causing chronic strain to cervicothoracic junction, altered breathing pattern associated with neck tension /compensatory use of neck musculature  Current: Improving postural awareness , inconsistent use of breathing techniques, progressing     2.  Patient reports reduction of pain to <or= 5/10 with all ROM and ADL  Status at [facility]: pain ranging 1-7/10, intermittent right arm pain ( C6 )  Current: Nearly resolved, tension only 8-6-19  Status on 9/26/19: pain ranging up to 6/10, bilateral neck shoulder tension due to sustained work load and stress, regressed     3. Reduction in radicular right arm symptoms by >or= 50% for increased ease with all activities  Status at [facility]: frequent right arm pain and paresthesia mike with work related postures during surgery  Current: RESOLVED without radicular symptoms  Status on 9/26/19: 100% reduction of arm pain , goal met     4. Patient reports reduction of pain to or= 3/10 with all recreational and work activities  Status at [address] [place] [address] Court: pain 1-7/10  Current:RESOLVED absence of pain 8-13-19  Status on 9/26/19: pain ranging up to 6/10 max at end of work day, regressed     5. Improved SB and Rotation by >or= 10 deg for increased ease with head turns while driving  Status at [facility]: SB 20 deg , Rot 40 deg with right CS pain  Current: MET 8-13-19  Status on 9/26/19: Flex 30 with stretching/pulling into right shoulder/arm, Ext 30 with pain , SB 20, Rot left 45, right 40, upper CS restriction, regressed        Long Term Goals: To be accomplished in 6 weeks:               1. Improved FOTO score to >or= 69/100 as evidence of improved function in regards to head turns when driving, work related activities and ADL  Status at [facility]: FOTO 59/100  Status on 9/26/19: FOTO 58/100, regressed     2. Ability to look up at a bird or sherrie evidenced by increased CS ROM to >o= 60 deg  Status at [facility]: Ext 50 deg with pain right CS  Current: Improving 8-1-19  Status on 9/26/19: active Ext restricted to 30 deg due to pain, regressed     3. Improved postural strength to allow  Proper alignment during all activities to minimize CS strain  Status at [facility]: deficit in postural strength, habitual FHP  Current: Improving 8-1-19  Status on 9/26/19: continues with deficits in postural strength    PLAN  []  Upgrade activities as tolerated     []  Continue plan of care  []  Update interventions per flow sheet       []  Discharge due to:_  []  Other:_      Yaima Delarosa PTA 10/8/2019  12:55 PM    Future Appointments   Date Time Provider Oz Pelaez   10/8/2019  4:00 PM Nohemi Eaton, [facility]   10/15/2019  8:00 AM Nohemi Eaton [facility]   10/22/2019  8:00 AM Nohemi Eaton [facility]   10/24/2019  2:00 PM Nohemi Eaton [facility]

## 2019-10-15 ENCOUNTER — HOSPITAL ENCOUNTER (OUTPATIENT)
Dept: PHYSICAL THERAPY | Age: 49
Discharge: HOME | End: 2019-10-15
Payer: COMMERCIAL

## 2019-10-15 PROCEDURE — 97110 THERAPEUTIC EXERCISES: CPT

## 2019-10-15 NOTE — PROGRESS NOTES
PT DAILY TREATMENT NOTE    Patient Name: Camilo Guevara  Date:10/15/2019  : 1970  [x]  Patient  Verified  Payor: Mikki Victoria / Plan: Marlon Pro / Product Type: Federal Funded Programs /    In [employer] time:850  Total Treatment Time (min): 35  Total Timed Codes (min): 35  1:1 Treatment Time ([facility] only): 35   Visit #: 11 of 17    Treatment Area: Cervical radiculopathy [M54.12]    SUBJECTIVE  Pain Level (0-10 scale): 10  Any medication changes, allergies to medications, adverse drug reactions, diagnosis change, or new procedure performed?: [x] No    [] Yes (see summary sheet for update)  Subjective functional status/changes:   [] No changes reported  Pt reports that she was late because her son missed his bus so he had to be driven to school. OBJECTIVE        35 min Manual Therapy:  SOR, myofascial arm pulls, MFR to anterior/posterior neck, craniosacral release to spine, cranial plate releases   Rationale: decrease pain, increase ROM, increase tissue extensibility, decrease edema  and decrease trigger points to improve the patients ability to complete surgical day without pain          With   [x] TE   [] TA   [] neuro   [] other: Patient Education: [x] Review HEP    [] Progressed/Changed HEP based on:   [x] positioning   [x] body mechanics   [] transfers   [x] heat/ice application    [] other:      Other Objective/Functional Measures:    Pain Level (0-10 scale) post treatment: 4/10    ASSESSMENT/Changes in Function: Pt demonstrates right sided restrictions on head and neck with continued stress at very high levels contributing to ongoing pain. Discussed self care management to reduce pain and myofascial restrictions.     Patient will continue to benefit from skilled PT services to address functional mobility deficits, address ROM deficits, address strength deficits, analyze and address soft tissue restrictions, analyze and cue movement patterns, analyze and modify body mechanics/ergonomics and assess and modify postural abnormalities to attain remaining goals. []  See Plan of Care  []  See progress note/recertification  []  See Discharge Summary         Progress towards goals / Updated goals:  Short Term Goals: To be accomplished in 3 weeks:               1. Patient has good understanding of correct postural alignment and breathing pattern to allow self correction during ADL/work  Status at [facility]: [facility] causing chronic strain to cervicothoracic junction, altered breathing pattern associated with neck tension /compensatory use of neck musculature  Current: Improving postural awareness , inconsistent use of breathing techniques, progressing     2. Patient reports reduction of pain to <or= 5/10 with all ROM and ADL  Status at [facility]: pain ranging 1-7/10, intermittent right arm pain ( C6 )  Current: Nearly resolved, tension only 8-6-19  Status on 9/26/19: pain ranging up to 6/10, bilateral neck shoulder tension due to sustained work load and stress, regressed     3. Reduction in radicular right arm symptoms by >or= 50% for increased ease with all activities  Status at [facility]: frequent right arm pain and paresthesia mike with work related postures during surgery  Current: RESOLVED without radicular symptoms  Status on 9/26/19: 100% reduction of arm pain , goal met     4. Patient reports reduction of pain to or= 3/10 with all recreational and work activities  Status at [address] [place] [address] Court: pain 1-7/10  Current:RESOLVED absence of pain 8-13-19  Status on 9/26/19: pain ranging up to 6/10 max at end of work day, regressed     5.  Improved SB and Rotation by >or= 10 deg for increased ease with head turns while driving  Status at [facility]: SB 20 deg , Rot 40 deg with right CS pain  Current: MET 8-13-19  Status on 9/26/19: Flex 30 with stretching/pulling into right shoulder/arm, Ext 30 with pain , SB 20, Rot left 45, right 40, upper CS restriction, regressed  Current: Slight improvement 10-15-19      Long Term Goals: To be accomplished in 6 weeks:               1. Improved FOTO score to >or= 69/100 as evidence of improved function in regards to head turns when driving, work related activities and ADL  Status at [facility]: FOTO 59/100  Status on 9/26/19: FOTO 58/100, regressed     2. Ability to look up at a bird or sherrie evidenced by increased CS ROM to >o= 60 deg  Status at [facility]: Ext 50 deg with pain right CS  Current: Improving 8-1-19  Status on 9/26/19: active Ext restricted to 30 deg due to pain, regressed     3. Improved postural strength to allow  Proper alignment during all activities to minimize CS strain  Status at [facility]: deficit in postural strength, habitual FHP  Current: Improving 8-1-19  Status on 9/26/19: continues with deficits in postural strength       PLAN  []  Upgrade activities as tolerated     []  Continue plan of care  []  Update interventions per flow sheet       []  Discharge due to:_  []  Other:_      Grace Guido PTA 10/15/2019  8:56 AM    Future Appointments   Date Time Provider Oz Pelaez   10/22/2019  8:00 AM Khadijah Kearns PTA [facility]   10/24/2019  2:00 PM Khadijah Kearns [facility]

## 2019-10-22 ENCOUNTER — HOSPITAL ENCOUNTER (OUTPATIENT)
Dept: PHYSICAL THERAPY | Age: 49
Discharge: HOME | End: 2019-10-22
Payer: COMMERCIAL

## 2019-10-22 PROCEDURE — 97140 MANUAL THERAPY 1/> REGIONS: CPT

## 2019-10-22 NOTE — PROGRESS NOTES
PT DAILY TREATMENT NOTE    Patient Name: Qasim Sifuentes  Date:10/22/2019  : 1970  [x]  Patient  Verified  Payor: Indiana Mastersonw / Plan: Pricilla Zheng / Product Type: Federal Funded Programs /    In [employer] [employer] time:853  Total Treatment Time (min): 38  Total Timed Codes (min): 38  1:1 Treatment Time ([facility] only): 45   Visit #: 12 of 17    Treatment Area: Cervical radiculopathy [M54.12]    SUBJECTIVE  Pain Level (0-10 scale): 6/10  Any medication changes, allergies to medications, adverse drug reactions, diagnosis change, or new procedure performed?: [x] No    [] Yes (see summary sheet for update)  Subjective functional status/changes:   [] No changes reported  Pt states that she is looking at yoga to help her manage her dysphonia and feeling of choking because her neck is so tight    OBJECTIVE      38 min Manual Therapy:  SOR, STM to scalenes, SCm, myofascial arm pull on right, MFR to anterior/posterior neck/chest   Rationale: decrease pain, increase ROM, increase tissue extensibility, decrease edema  and decrease trigger points to improve the patients ability to complete surgical day    With   [x] TE   [] TA   [] neuro   [] other: Patient Education: [x] Review HEP    [] Progressed/Changed HEP based on:   [x] positioning   [x] body mechanics   [] transfers   [x] heat/ice application    [] other:      Other Objective/Functional Measures     Pain Level (0-10 scale) post treatment: 2/10    ASSESSMENT/Changes in Function: Pt demonstrates improved cervical rotation and extension with noted improved dysphonia. Discussed adding yoga on a regular basis, sharing private practitioner who can help her practice at home. Patient will continue to benefit from skilled PT services to address functional mobility deficits, address ROM deficits, address strength deficits, analyze and address soft tissue restrictions and analyze and cue movement patterns to attain remaining goals.      []  See Plan of Care  []  See progress note/recertification  []  See Discharge Summary         Progress towards goals / Updated goals:  Short Term Goals: To be accomplished in 3 weeks:               1. Patient has good understanding of correct postural alignment and breathing pattern to allow self correction during ADL/work  Status at [facility]: [facility] causing chronic strain to cervicothoracic junction, altered breathing pattern associated with neck tension /compensatory use of neck musculature  Current: Improving postural awareness , inconsistent use of breathing techniques, progressing     2. Patient reports reduction of pain to <or= 5/10 with all ROM and ADL  Status at [facility]: pain ranging 1-7/10, intermittent right arm pain ( C6 )  Current: Nearly resolved, tension only 8-6-19  Status on 9/26/19: pain ranging up to 6/10, bilateral neck shoulder tension due to sustained work load and stress, regressed     3. Reduction in radicular right arm symptoms by >or= 50% for increased ease with all activities  Status at [facility]: frequent right arm pain and paresthesia mike with work related postures during surgery  Current: RESOLVED without radicular symptoms  Status on 9/26/19: 100% reduction of arm pain , goal met     4. Patient reports reduction of pain to or= 3/10 with all recreational and work activities  Status at [address] [place] [address] Court: pain 1-7/10  Current:RESOLVED absence of pain 8-13-19  Status on 9/26/19: pain ranging up to 6/10 max at end of work day, regressed  Current: 6/10 at worst.     5. Improved SB and Rotation by >or= 10 deg for increased ease with head turns while driving  Status at [facility]: SB 20 deg , Rot 40 deg with right CS pain  Current: MET 8-13-19  Status on 9/26/19: Flex 30 with stretching/pulling into right shoulder/arm, Ext 30 with pain , SB 20, Rot left 45, right 40, upper CS restriction, regressed  Current: Slight improvement 10-15-19      Long Term Goals: To be accomplished in 6 weeks:               1.  Improved FOTO score to >or= 69/100 as evidence of improved function in regards to head turns when driving, work related activities and ADL  Status at [facility]: FOTO 59/100  Status on 9/26/19: FOTO 58/100, regressed     2. Ability to look up at a bird or sherrie evidenced by increased CS ROM to >o= 60 deg  Status at [facility]: Ext 50 deg with pain right CS  Current: Improving 8-1-19  Status on 9/26/19: active Ext restricted to 30 deg due to pain, regressed  Current: Improving 10-22-19     3. Improved postural strength to allow  Proper alignment during all activities to minimize CS strain  Status at [facility]: deficit in postural strength, habitual FHP  Current: Improving 8-1-19  Status on 9/26/19: continues with deficits in postural strength  Current: No change 10-22-19       PLAN  []  Upgrade activities as tolerated     []  Continue plan of care  []  Update interventions per flow sheet       []  Discharge due to:_  []  Other:_      Ailin Benitez, PTA 10/22/2019  8:12 AM    Future Appointments   Date Time Provider Oz Pelaez   10/24/2019  2:00 PM Elzbieta Paredes [facility]

## 2019-10-24 ENCOUNTER — HOSPITAL ENCOUNTER (OUTPATIENT)
Dept: PHYSICAL THERAPY | Age: 49
Discharge: HOME | End: 2019-10-24
Payer: COMMERCIAL

## 2019-10-24 PROCEDURE — 97112 NEUROMUSCULAR REEDUCATION: CPT

## 2019-10-24 PROCEDURE — 97140 MANUAL THERAPY 1/> REGIONS: CPT

## 2019-10-24 NOTE — PROGRESS NOTES
PT DAILY TREATMENT NOTE    Patient Name: Mallika Che  Date:10/24/2019  : 1970  [x]  Patient  Verified  Payor: Sofia Pyle / Plan: Yoseph Gambino / Product Type: [employer] /    [facility] time:300  Total Treatment Time (min): 60  Total Timed Codes (min): 60  1:1 Treatment Time ( [address] only): 60   Visit #: 13 of 17    Treatment Area: Cervical radiculopathy [M54.12]    SUBJECTIVE  Pain Level (0-10 scale): 4/10  Any medication changes, allergies to medications, adverse drug reactions, diagnosis change, or new procedure performed?: [x] No    [] Yes (see summary sheet for update)  Subjective functional status/changes:   [] No changes reported  Pt reports that she has been much better since last session. She states that she plans on contacting the  to help address her weakness and mobility issues in her neck.   She does think her AROM is better despite her continued pain from doing surgery for extended hours    OBJECTIVE    Modalities Rationale:     decrease pain and increase tissue extensibility to improve patient's ability to complete day of surgery   min [] Estim, type/location:                                      []  att     []  unatt     []  w/US     []  w/ice    []  w/heat    min []  Mechanical Traction: type/lbs                   []  pro   []  sup   []  int   []  cont    []  before manual    []  after manual    min []  Ultrasound, settings/location:      min []  Iontophoresis w/ dexamethasone, location:                                               []  take home patch       []  in clinic   With treatment min []  Ice     [x]  Heat    location/position: Inclined bound angle    min []  Vasopneumatic Device, press/temp:     min []  Other:    [x] Skin assessment post-treatment (if applicable):    [x]  intact    [x]  redness- no adverse reaction     []redness - adverse reaction:             20 min Neuromuscular Re-education:  []  See flow sheet :restorative yoga, inclined bound angle   Rationale: increase ROM, improve coordination and increase proprioception  to improve the patients ability to improve postural awareness    40 min Manual Therapy:  SOR, MFR to anterior/posterior chest, myofascial arm pull bilaterally, DTM to scalenes, upper traps, SCM, lev scap   Rationale: decrease pain, increase ROM, increase tissue extensibility, decrease edema  and decrease trigger points to improve the patients ability to complete day of surgery              With   [] TE   [] TA   [] neuro   [] other: Patient Education: [x] Review HEP    [] Progressed/Changed HEP based on:   [] positioning   [] body mechanics   [] transfers   [] heat/ice application    [] other:      Other Objective/Functional Measures:      Pain Level (0-10 scale) post treatment: 1/10    ASSESSMENT/Changes in Function: Pt demonstrates improving CROM into all planes of motion with exception of sidebending. She notes overall improvement and improved quality of her voice however still challenged in work environment. Patient will continue to benefit from skilled PT services to address functional mobility deficits, address ROM deficits, address strength deficits, analyze and address soft tissue restrictions, analyze and cue movement patterns, analyze and modify body mechanics/ergonomics and assess and modify postural abnormalities to attain remaining goals. []  See Plan of Care  []  See progress note/recertification  []  See Discharge Summary         Progress towards goals / Updated goals:  Short Term Goals: To be accomplished in 3 weeks:               1.  Patient has good understanding of correct postural alignment and breathing pattern to allow self correction during ADL/work  Status at [facility]: [facility] causing chronic strain to cervicothoracic junction, altered breathing pattern associated with neck tension /compensatory use of neck musculature  Current: Improving postural awareness , inconsistent use of breathing techniques, progressing     2. Patient reports reduction of pain to <or= 5/10 with all ROM and ADL  Status at [facility]: pain ranging 1-7/10, intermittent right arm pain ( C6 )  Current: Nearly resolved, tension only 8-6-19  Status on 9/26/19: pain ranging up to 6/10, bilateral neck shoulder tension due to sustained work load and stress, regressed   Current: Pain ranges from 0-6/10 dependent on length of surgical day. Pt agrees she will set up the ergonomic assessment  3. Reduction in radicular right arm symptoms by >or= 50% for increased ease with all activities  Status at [facility]: frequent right arm pain and paresthesia mike with work related postures during surgery 10-24-19  Current: RESOLVED without radicular symptoms  Status on 9/26/19: 100% reduction of arm pain , goal met     4. Patient reports reduction of pain to or= 3/10 with all recreational and work activities  Status at [address] [place] [address] Court: pain 1-7/10  Current:RESOLVED absence of pain 8-13-19  Status on 9/26/19: pain ranging up to 6/10 max at end of work day, regressed  Current: 6/10 at worst. 10-24-19     5. Improved SB and Rotation by >or= 10 deg for increased ease with head turns while driving  Status at [facility]: SB 20 deg , Rot 40 deg with right CS pain  Current: MET 8-13-19  Status on 9/26/19: Flex 30 with stretching/pulling into right shoulder/arm, Ext 30 with pain , SB 20, Rot left 45, right 40, upper CS restriction, regressed  Current: Flexion 40 degrees, Extension 45 degrees, Sidebending 30 degrees each and no change with rotation IMPROVING 10-24-19     Long Term Goals: To be accomplished in 6 weeks:               1. Improved FOTO score to >or= 69/100 as evidence of improved function in regards to head turns when driving, work related activities and ADL  Status at [facility]: FOTO 59/100  Status on 9/26/19: FOTO 58/100, regressed     2.  Ability to look up at a bird or sherrie evidenced by increased CS ROM to >o= 60 deg  Status at [facility]: Ext 50 deg with pain right CS  Current: Improving 8-1-19  Status on 9/26/19: active Ext restricted to 30 deg due to pain, regressed  Current: 45 degrees 10-24-19 IMPROVING since last visit     3. Improved postural strength to allow  Proper alignment during all activities to minimize CS strain  Status at [facility]: deficit in postural strength, habitual FHP  Current: Improving 8-1-19  Status on 9/26/19: continues with deficits in postural strength  Current: Some improvement 10-24-19          PLAN  []  Upgrade activities as tolerated     [x]  Continue plan of care  []  Update interventions per flow sheet       []  Discharge due to:_  [x]  Other:continue for last two visits to improve HEP compliance_      Cosme Pitts, PTA 10/24/2019  2:42 PM    No future appointments.

## 2019-10-24 NOTE — PROGRESS NOTES
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 She notes overall improvement and improved quality of her voice however still challenged in work environment. Progress towards goals / Updated goals:  Short Term Goals: To be accomplished in 3 weeks:               1. Patient has good understanding of correct postural alignment and breathing pattern to allow self correction during ADL/work  Status at [facility]: [facility] causing chronic strain to cervicothoracic junction, altered breathing pattern associated with neck tension /compensatory use of neck musculature  Current: Improving postural awareness , inconsistent use of breathing techniques, progressing     2. Patient reports reduction of pain to <or= 5/10 with all ROM and ADL  Status at [facility]: pain ranging 1-7/10, intermittent right arm pain ( C6 )  Current: Nearly resolved, tension only 8-6-19  Status on 19: pain ranging up to 6/10, bilateral neck shoulder tension due to sustained work load and stress, regressed   Current: Pain ranges from 0-6/10 dependent on length of surgical day.   Pt agrees she will set up the ergonomic assessment  3. Reduction in radicular right arm symptoms by >or= 50% for increased ease with all activities  Status at [facility]: frequent right arm pain and paresthesia mike with work related postures during surgery 10-24-19  Current: RESOLVED without radicular symptoms  Status on 9/26/19: 100% reduction of arm pain , goal met     4. Patient reports reduction of pain to or= 3/10 with all recreational and work activities  Status at [facility]: pain 1-7/10  Current:RESOLVED absence of pain 8-13-19  Status on 9/26/19: pain ranging up to 6/10 max at end of work day, regressed  Current: 6/10 at worst. 10-24-19     5. Improved SB and Rotation by >or= 10 deg for increased ease with head turns while driving  Status at [facility]: SB 20 deg , Rot 40 deg with right CS pain  Current: MET 8-13-19  Status on 9/26/19: Flex 30 with stretching/pulling into right shoulder/arm, Ext 30 with pain , SB 20, Rot left 45, right 40, upper CS restriction, regressed  Current: Flexion 40 degrees, Extension 45 degrees, Sidebending 30 degrees each and no change with rotation IMPROVING 10-24-19     Long Term Goals: To be accomplished in 6 weeks:               1. Improved FOTO score to >or= 69/100 as evidence of improved function in regards to head turns when driving, work related activities and ADL  Status at [facility]: FOTO 59/100  Status on 9/26/19: FOTO 58/100, regressed  Current status: to be tested on d/c visit     2. Ability to look up at a bird or sherrie evidenced by increased CS ROM to >o= 60 deg  Status at [facility]: Ext 50 deg with pain right CS  Current: Improving 8-1-19  Status on 9/26/19: active Ext restricted to 30 deg due to pain, regressed  Current: 45 degrees 10-24-19 IMPROVING since last visit     3. Improved postural strength to allow  Proper alignment during all activities to minimize CS strain  Status at [facility]: deficit in postural strength, habitual FHP  Current: Improving 8-1-19  Status on 9/26/19: continues with deficits in postural strength  Current: Some improvement 10-24-19      ASSESSMENT/RECOMMENDATIONS:  [x]Continue therapy per initial plan/protocol at a frequency for 2 remaining visits to assure compliance and independence with HEP and management of symptoms  []Continue therapy with the following recommended changes:_____________________ _____________________________ ________________________________________  []Discontinue therapy progressing towards or have reached established goals  []Discontinue therapy due to lack of appreciable progress towards goals  []Discontinue therapy due to lack of attendance or compliance  []Await Physician's recommendations/decisions regarding therapy  []Other:________________________________________________________________    Thank you for this referral.   Suman Ramon, PT 10/24/2019 4:45 PM    NOTE TO PHYSICIAN:  PLEASE COMPLETE THE ORDERS BELOW AND   FAX TO [facility]: [phone]  If you are unable to process this request in 24 hours please contact our office: [phone]      ____ I have read the above report and request that my patient continue therapy with the following changes/special instructions:  ____ I have read the above report and request that my patient be discharged from therapy    Physician's Signature:____________Date:_________TIME:________    ** Signature, Date and Time must be completed for valid certification **

## 2019-11-05 ENCOUNTER — HOSPITAL ENCOUNTER (OUTPATIENT)
Dept: PHYSICAL THERAPY | Age: 49
Discharge: HOME | End: 2019-11-05
Payer: COMMERCIAL

## 2019-11-05 PROCEDURE — 97140 MANUAL THERAPY 1/> REGIONS: CPT

## 2019-11-05 PROCEDURE — 97110 THERAPEUTIC EXERCISES: CPT

## 2019-11-05 NOTE — PROGRESS NOTES
PT DAILY TREATMENT NOTE    Patient Name: Maty Matthews  Date:2019  : 1970  [x]  Patient  Verified  Payor: Rosalio Carrel / Plan: [employer] / Product Type: Placed /    In [employer] time:310  Total Treatment Time (min): 37  Total Timed Codes (min): 37  1:1 Treatment Time ( [address] only): 40   Visit #: 14 of 15    Treatment Area: Cervical radiculopathy [M54.12]    SUBJECTIVE  Pain Level (0-10 scale): 4/10  Any medication changes, allergies to medications, adverse drug reactions, diagnosis change, or new procedure performed?: [x] No    [] Yes (see summary sheet for update)  Subjective functional status/changes:   [] No changes reported  Pt reports that she has been doing her stretches and is working on connecting with the [employer] and ergonomics person    OBJECTIVE      25 min Therapeutic Exercise:  [] See flow sheet :   Rationale: increase ROM, increase strength, improve coordination and increase proprioception to improve the patients ability to complete work day    12 min Manual Therapy:  SOR, CROM, DTM to scalenes SCm on right   Rationale: decrease pain, increase ROM, increase tissue extensibility, decrease edema  and decrease trigger points to improve the patients ability to complete work day          With   [] TE   [] TA   [] neuro   [] other: Patient Education: [x] Review HEP    [] Progressed/Changed HEP based on:   [] positioning   [] body mechanics   [] transfers   [] heat/ice application    [] other:      Other Objective/Functional Measures     Pain Level (0-10 scale) post treatment:0/10    ASSESSMENT/Changes in Function: Pt demonstrates improving strength and CROM however still challenged with thoracic strength contributing to ongoing pain. Pt provided with written HEP to work on stretching and strengthening in thoracic spine.     Patient will continue to benefit from skilled PT services to address functional mobility deficits, address ROM deficits, address strength deficits, analyze and address soft tissue restrictions, analyze and cue movement patterns, analyze and modify body mechanics/ergonomics and assess and modify postural abnormalities to attain remaining goals. []  See Plan of Care  []  See progress note/recertification  []  See Discharge Summary         Progress towards goals / Updated goals:  Short Term Goals: To be accomplished in 3 weeks:               1. Patient has good understanding of correct postural alignment and breathing pattern to allow self correction during ADL/work  Status at [facility]: [facility] causing chronic strain to cervicothoracic junction, altered breathing pattern associated with neck tension /compensatory use of neck musculature  Current: Improving postural awareness , inconsistent use of breathing techniques, progressing     2. Patient reports reduction of pain to <or= 5/10 with all ROM and ADL  Status at [facility]: pain ranging 1-7/10, intermittent right arm pain ( C6 )  Current: Nearly resolved, tension only 8-6-19  Status on 9/26/19: pain ranging up to 6/10, bilateral neck shoulder tension due to sustained work load and stress, regressed   Current: Pain ranges from 0-6/10 dependent on length of surgical day.  Pt agrees she will set up the ergonomic assessment  3. Reduction in radicular right arm symptoms by >or= 50% for increased ease with all activities  Status at [facility]: frequent right arm pain and paresthesia mike with work related postures during [address] without radicular symptoms  Status on 9/26/19: 100% reduction of arm pain , goal met     4. Patient reports reduction of pain to or= 3/10 with all recreational and work activities  Status at [address] [place] [address] Court: pain 1-7/10  Current:RESOLVED absence of pain 8-13-19  Status on 9/26/19: pain ranging up to 6/10 max at end of work day, regressed  Current: 6/10 at [address] [place] [address]  5.  Improved SB and Rotation by >or= 10 deg for increased ease with head turns while driving  Status at [facility]: SB 20 deg , Rot 40 deg with right CS pain  Current: MET 8-13-19  Status on 9/26/19: Flex 30 with stretching/pulling into right shoulder/arm, Ext 30 with pain , SB 20, Rot left 45, right 40, upper CS restriction, regressed  Current: Flexion 40 degrees, Extension 45 degrees, Sidebending 30 degrees each and no change with rotation IMPROVING 10-24-19     Long Term Goals: To be accomplished in 6 weeks:               1. Improved FOTO score to >or= 69/100 as evidence of improved function in regards to head turns when driving, work related activities and ADL  Status at [facility]: FOTO 59/100  Status on 9/26/19: FOTO 58/100, regressed  Current status: to be tested on d/c visit     2. Ability to look up at a bird or sherrie evidenced by increased CS ROM to >o= 60 deg  Status at [facility]: Ext 50 deg with pain right CS  Current: Improving 8-1-19  Status on 9/26/19: active Ext restricted to 30 deg due to pain, regressed  Current: 45 degrees 10-24-19 IMPROVING since last visit     3. Improved postural strength to allow  Proper alignment during all activities to minimize CS strain  Status at [facility]: deficit in postural strength, habitual FHP  Current: Improving 8-1-19  Status on 9/26/19: continues with deficits in postural strength  Current: Some improvement 10-24-19       PLAN  []  Upgrade activities as tolerated     []  Continue plan of care  []  Update interventions per flow sheet       []  Discharge due to:_  []  Other:_      Lakeisha Vital PTA 11/5/2019  3:24 PM    Future Appointments   Date Time Provider Oz Pelaez   11/12/2019  1:00 PM Kayli Elizabeth PTA [facility]

## 2019-11-12 ENCOUNTER — HOSPITAL ENCOUNTER (OUTPATIENT)
Dept: PHYSICAL THERAPY | Age: 49
Discharge: HOME | End: 2019-11-12
Payer: COMMERCIAL

## 2019-11-12 PROCEDURE — 97140 MANUAL THERAPY 1/> REGIONS: CPT

## 2019-11-12 PROCEDURE — 97112 NEUROMUSCULAR REEDUCATION: CPT

## 2019-11-12 NOTE — PROGRESS NOTES
PT DAILY TREATMENT NOTE    Patient Name: Adia Huynh  Date:2019  : 1970  [x]  Patient  Verified  Payor: Chilo Lee / Plan: Maryse Reusing / Product Type: [employer] /    In time:100  Out time:155  Total Treatment Time (min): 55  Total Timed Codes (min): 55  1:1 Treatment Time ( [address] only): 54   Visit #: 15 of 15    Treatment Area: Cervical radiculopathy [M54.12]    SUBJECTIVE  Pain Level (0-10 scale): 3/10  Any medication changes, allergies to medications, adverse drug reactions, diagnosis change, or new procedure performed?: [x] No    [] Yes (see summary sheet for update)  Subjective functional status/changes:   [] No changes reported  Pt reports that she chatted with Sam Mesa who is going to do an ergonomic assessment of her surgical area as well as her office. She states that she is trying to set up a time with yoga and regular appointments with massage terhapy.     OBJECTIVE    Modalities Rationale:     decrease pain and increase tissue extensibility to improve patient's ability to stand upright during surgery   min [] Estim, type/location:                                      []  att     []  unatt     []  w/US     []  w/ice    []  w/heat    min []  Mechanical Traction: type/lbs                   []  pro   []  sup   []  int   []  cont    []  before manual    []  after manual    min []  Ultrasound, settings/location:      min []  Iontophoresis w/ dexamethasone, location:                                               []  take home patch       []  in clinic   With treatment min []  Ice     [x]  Heat    location/position: Inclined bound angle neck and abdomen    min []  Vasopneumatic Device, press/temp:     min []  Other:    [x] Skin assessment post-treatment (if applicable):    [x]  intact    [x]  redness- no adverse reaction     []redness - adverse reaction:            with manual min Therapeutic Activity:  []  See flow sheet :HEP, self care   Rationale: increase ROM, improve coordination and increase proprioception  to improve the patients ability to stand upright during surgery     15 min Neuromuscular Re-education:  []  See flow sheet :restorative yoga   Rationale: increase ROM, improve coordination and increase proprioception  to improve the patients ability to stand upright during surgery    40 min Manual Therapy:  SOR, STm to scalenes and SCm, myofascial arm pulls, MFR to anterior/posterior neck   Rationale: decrease pain, increase ROM, increase tissue extensibility, decrease edema  and decrease trigger points to improve the patients ability to stand upright during surgery          With   [] TE   [] TA   [x] neuro   [] other: Patient Education: [x] Review HEP    [] Progressed/Changed HEP based on:   [x] positioning   [x] body mechanics   [] transfers   [x] heat/ice application    [] other:      Other Objective/Functional Measures:     Pain Level (0-10 scale) post treatment: 0    ASSESSMENT/Changes in Function: Pt demonstrates improved mobility however continues to lack strength and has had trouble with follow through on HEP due to surgical/work demands. She has set up ergonomic assessment and is about to set up regular private yoga sessions which will help improve her overall strength and overall function. Pt provided with HEP at last visit    Patient will continue to benefit from skilled PT services to address functional mobility deficits, address ROM deficits, address strength deficits, analyze and address soft tissue restrictions, analyze and cue movement patterns, analyze and modify body mechanics/ergonomics, assess and modify postural abnormalities and instruct in home and community integration to attain remaining goals. []  See Plan of Care  []  See progress note/recertification  []  See Discharge Summary         Progress towards goals / Updated goals:  Short Term Goals: To be accomplished in 3 weeks:               1.  Patient has good understanding of correct postural alignment and breathing pattern to allow self correction during ADL/work  Status at [facility]: [facility] causing chronic strain to cervicothoracic junction, altered breathing pattern associated with neck tension /compensatory use of neck musculature  Current: Improving postural awareness , more consistent breathing techniques and follow thorugh on all tools. MET     2. Patient reports reduction of pain to <or= 5/10 with all ROM and ADL  Status at [facility]: pain ranging 1-7/10, intermittent right arm pain ( C6 )  Current: Nearly resolved, tension only 8-6-19  Status on 9/26/19: pain ranging up to 6/10, bilateral neck shoulder tension due to sustained work load and stress, regressed   Current: Pain ranges from 0-4/10 dependent on length of surgical day.   MET 11-12-19  3. Reduction in radicular right arm symptoms by >or= 50% for increased ease with all activities  Status at [facility]: frequent right arm pain and paresthesia mike with work related postures during [address] without radicular symptoms  Status on 9/26/19: 100% reduction of arm pain , goal met     4. Patient reports reduction of pain to or= 3/10 with all recreational and work activities  Status at [address] [place] [address] Court: pain 1-7/10  Current:RESOLVED absence of pain 8-13-19  Status on 9/26/19: pain ranging up to 6/10 max at end of work day, regressed  Current: 4/10 at [address] [place] [address] MET 11-12-19     5. Improved SB and Rotation by >or= 10 deg for increased ease with head turns while driving  Status at [facility]: SB 20 deg , Rot 40 deg with right CS pain  Current: MET 8-13-19  Status on 9/26/19: Flex 30 with stretching/pulling into right shoulder/arm, Ext 30 with pain , SB 20, Rot left 45, right 40, upper CS restriction, regressed  Current: Flexion 50 degrees, Extension 45 degrees, Sidebending 30 degrees each and 45 deg rotationn NEARLY MET 11-12-19     Long Term Goals: To be accomplished in 6 weeks:               1.  Improved FOTO score to >or= 69/100 as evidence of improved function in regards to head turns when driving, work related activities and ADL  Status at [address]: FOTO 59/100  Status on 9/26/19: FOTO 58/100, regressed  Current status: 61 NOT MET 11-12-19     2. Ability to look up at a bird or sherrie evidenced by increased CS ROM to >o= 60 deg  Status at [facility]: Ext 50 deg with pain right CS  Current: Improving 8-1-19  Status on 9/26/19: active Ext restricted to 30 deg due to pain, regressed  Current: 45 degrees NOT MET 11-12-19     3. Improved postural strength to allow  Proper alignment during all activities to minimize CS strain  Status at [facility]: deficit in postural strength, habitual FHP  Current: Improving 8-1-19  Status on 9/26/19: continues with deficits in postural strength  Current: Some improvement overall however still has pain with surgical day PARTIALLY MET 11-12-19       PLAN  []  Upgrade activities as tolerated     []  Continue plan of care  []  Update interventions per flow sheet       [x]  Discharge due to:insurance issues_  []  Other:_      Tyshawn Cast PTA 11/12/2019  12:57 PM    Future Appointments   Date Time Provider Oz Pelaez   11/12/2019  1:00 PM Marlon Aj PTA [facility]

## 2019-11-19 NOTE — PROGRESS NOTES
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 Patient has met 3 of 7 goals. Unmet goals are related to residual deficits in strength and fluctuating pain levels. She has set up ergonomic assessment and is about to set up regular private yoga sessions which will help improve her overall strength and overall function. Pt provided with HEP at last visit              Progress towards goals / Updated goals:  Short Term Goals: To be accomplished in 3 weeks:               1. Patient has good understanding of correct postural alignment and breathing pattern to allow self correction during ADL/work  Status at [facility]: [facility] causing chronic strain to cervicothoracic junction, altered breathing pattern associated with neck tension /compensatory use of neck musculature  Current: Improving postural awareness , more consistent breathing techniques and follow thorugh on all tools.  MET     2. Patient reports reduction of pain to <or= 5/10 with all ROM and ADL  Status at [facility]: pain ranging 1-7/10, intermittent right arm pain ( C6 )  Current: Nearly resolved, tension only 8-6-19  Status on 19: pain ranging up to 6/10, bilateral neck shoulder tension due to sustained work load and stress, regressed   Current: Pain ranges from 0-4/10 dependent on length of surgical day.   MET 11-12-19  3. Reduction in radicular right arm symptoms by >or= 50% for increased ease with all activities  Status at [facility]: frequent right arm pain and paresthesia mike with work related postures during [address] without radicular symptoms  Status on 9/26/19: 100% reduction of arm pain , goal met     4. Patient reports reduction of pain to or= 3/10 with all recreational and work activities  Status at [address] [place] [address] Court: pain 1-7/10  Current:RESOLVED absence of pain 8-13-19  Status on 9/26/19: pain ranging up to 6/10 max at end of work day, regressed  Current: 4/10 at [address] [place] [address] MET 11-12-19     5. Improved SB and Rotation by >or= 10 deg for increased ease with head turns while driving  Status at [facility]: SB 20 deg , Rot 40 deg with right CS pain  Current: MET 8-13-19  Status on 9/26/19: Flex 30 with stretching/pulling into right shoulder/arm, Ext 30 with pain , SB 20, Rot left 45, right 40, upper CS restriction, regressed  Current: Flexion 50 degrees, Extension 45 degrees, Sidebending 30 degrees each and 45 deg rotationn NEARLY MET 11-12-19     Long Term Goals: To be accomplished in 6 weeks:               1. Improved FOTO score to >or= 69/100 as evidence of improved function in regards to head turns when driving, work related activities and ADL  Status at [facility]: FOTO 59/100  Status on 9/26/19: FOTO 58/100, regressed  Current status: 61 NOT MET 11-12-19     2. Ability to look up at a bird or sherrie evidenced by increased CS ROM to >o= 60 deg  Status at [facility]: Ext 50 deg with pain right CS  Current: Improving 8-1-19  Status on 9/26/19: active Ext restricted to 30 deg due to pain, regressed  Current: 45 degrees NOT MET 11-12-19     3. Improved postural strength to allow  Proper alignment during all activities to minimize CS strain  Status at [facility]: deficit in postural strength, habitual FHP  Current: Improving 8-1-19  Status on 9/26/19: continues with deficits in postural strength  Current: Some improvement overall however still has pain with surgical day PARTIALLY MET 11-12-19         ASSESSMENT/RECOMMENDATIONS:  [x]Discontinue therapy: [x]Patient has reached or is progressing toward set goals      []Patient is non-compliant or has abdicated      []Due to lack of appreciable progress towards set goals    Lazarus Samuel, PT 11/19/2019 9:11 AM

## 2020-01-24 ENCOUNTER — HOSPITAL ENCOUNTER (OUTPATIENT)
Dept: LAB | Age: 50
Discharge: HOME | End: 2020-01-24
Payer: OTHER GOVERNMENT

## 2020-01-24 PROCEDURE — 88305 TISSUE EXAM BY PATHOLOGIST: CPT

## 2024-02-02 ENCOUNTER — HOSPITAL ENCOUNTER (OUTPATIENT)
Facility: HOSPITAL | Age: 54
Discharge: HOME | End: 2024-02-05

## 2024-02-02 LAB — SENTARA SPECIMEN COLLECTION: NORMAL

## 2024-02-06 ENCOUNTER — HOSPITAL ENCOUNTER (OUTPATIENT)
Facility: HOSPITAL | Age: 54
Discharge: HOME | End: 2024-02-09
Payer: COMMERCIAL

## 2024-02-06 DIAGNOSIS — R10.30 LOWER ABDOMINAL PAIN, UNSPECIFIED: ICD-10-CM

## 2024-02-06 PROCEDURE — 2500000003 HC RX 250 WO HCPCS: Performed by: NURSE PRACTITIONER

## 2024-02-06 PROCEDURE — 74177 CT ABD & PELVIS W/CONTRAST: CPT

## 2024-02-06 PROCEDURE — 6360000004 HC RX CONTRAST MEDICATION: Performed by: NURSE PRACTITIONER

## 2024-02-06 RX ADMIN — BARIUM SULFATE 900 ML: 20 SUSPENSION ORAL at 14:31

## 2024-02-06 RX ADMIN — IOPAMIDOL 100 ML: 612 INJECTION, SOLUTION INTRAVENOUS at 14:31

## 2024-05-29 ENCOUNTER — HOSPITAL ENCOUNTER (OUTPATIENT)
Facility: HOSPITAL | Age: 54
Setting detail: OUTPATIENT SURGERY
Discharge: HOME | End: 2024-05-29
Attending: SURGERY | Admitting: SURGERY
Payer: COMMERCIAL

## 2024-05-29 ENCOUNTER — ANESTHESIA (OUTPATIENT)
Facility: HOSPITAL | Age: 54
End: 2024-05-29
Payer: COMMERCIAL

## 2024-05-29 ENCOUNTER — ANESTHESIA EVENT (OUTPATIENT)
Facility: HOSPITAL | Age: 54
End: 2024-05-29
Payer: COMMERCIAL

## 2024-05-29 VITALS
SYSTOLIC BLOOD PRESSURE: 124 MMHG | HEART RATE: 77 BPM | TEMPERATURE: 97.7 F | HEIGHT: 64 IN | RESPIRATION RATE: 14 BRPM | OXYGEN SATURATION: 100 % | BODY MASS INDEX: 30.95 KG/M2 | WEIGHT: 181.3 LBS | DIASTOLIC BLOOD PRESSURE: 71 MMHG

## 2024-05-29 DIAGNOSIS — Z90.49 S/P LAPAROSCOPIC CHOLECYSTECTOMY: Primary | ICD-10-CM

## 2024-05-29 LAB
ALBUMIN SERPL-MCNC: 3.5 G/DL (ref 3.4–5)
ALBUMIN/GLOB SERPL: 0.9 (ref 0.8–1.7)
ALP SERPL-CCNC: 91 U/L (ref 45–117)
ALT SERPL-CCNC: 36 U/L (ref 13–56)
ANION GAP SERPL CALC-SCNC: 3 MMOL/L (ref 3–18)
AST SERPL-CCNC: 19 U/L (ref 10–38)
BASOPHILS # BLD: 0.1 K/UL (ref 0–0.1)
BASOPHILS NFR BLD: 2 % (ref 0–2)
BILIRUB SERPL-MCNC: 0.4 MG/DL (ref 0.2–1)
BUN SERPL-MCNC: 22 MG/DL (ref 7–18)
BUN/CREAT SERPL: 21 (ref 12–20)
CALCIUM SERPL-MCNC: 9.1 MG/DL (ref 8.5–10.1)
CHLORIDE SERPL-SCNC: 107 MMOL/L (ref 100–111)
CO2 SERPL-SCNC: 28 MMOL/L (ref 21–32)
CREAT SERPL-MCNC: 1.03 MG/DL (ref 0.6–1.3)
DIFFERENTIAL METHOD BLD: ABNORMAL
EOSINOPHIL # BLD: 0.2 K/UL (ref 0–0.4)
EOSINOPHIL NFR BLD: 3 % (ref 0–5)
ERYTHROCYTE [DISTWIDTH] IN BLOOD BY AUTOMATED COUNT: 12.7 % (ref 11.6–14.5)
GLOBULIN SER CALC-MCNC: 3.9 G/DL (ref 2–4)
GLUCOSE SERPL-MCNC: 90 MG/DL (ref 74–99)
HCG UR QL: NEGATIVE
HCT VFR BLD AUTO: 41.6 % (ref 35–45)
HGB BLD-MCNC: 13.6 G/DL (ref 12–16)
IMM GRANULOCYTES # BLD AUTO: 0 K/UL (ref 0–0.04)
IMM GRANULOCYTES NFR BLD AUTO: 1 % (ref 0–0.5)
LYMPHOCYTES # BLD: 1.9 K/UL (ref 0.9–3.6)
LYMPHOCYTES NFR BLD: 24 % (ref 21–52)
MCH RBC QN AUTO: 29.5 PG (ref 24–34)
MCHC RBC AUTO-ENTMCNC: 32.7 G/DL (ref 31–37)
MCV RBC AUTO: 90.2 FL (ref 78–100)
MONOCYTES # BLD: 0.6 K/UL (ref 0.05–1.2)
MONOCYTES NFR BLD: 7 % (ref 3–10)
NEUTS SEG # BLD: 5 K/UL (ref 1.8–8)
NEUTS SEG NFR BLD: 64 % (ref 40–73)
NRBC # BLD: 0 K/UL (ref 0–0.01)
NRBC BLD-RTO: 0 PER 100 WBC
PLATELET # BLD AUTO: 370 K/UL (ref 135–420)
PMV BLD AUTO: 9 FL (ref 9.2–11.8)
POTASSIUM SERPL-SCNC: 4.1 MMOL/L (ref 3.5–5.5)
PROT SERPL-MCNC: 7.4 G/DL (ref 6.4–8.2)
RBC # BLD AUTO: 4.61 M/UL (ref 4.2–5.3)
SODIUM SERPL-SCNC: 138 MMOL/L (ref 136–145)
WBC # BLD AUTO: 7.9 K/UL (ref 4.6–13.2)

## 2024-05-29 PROCEDURE — 80053 COMPREHEN METABOLIC PANEL: CPT

## 2024-05-29 PROCEDURE — 6370000000 HC RX 637 (ALT 250 FOR IP): Performed by: ANESTHESIOLOGY

## 2024-05-29 PROCEDURE — 3600000012 HC SURGERY LEVEL 2 ADDTL 15MIN: Performed by: SURGERY

## 2024-05-29 PROCEDURE — 3700000000 HC ANESTHESIA ATTENDED CARE: Performed by: SURGERY

## 2024-05-29 PROCEDURE — 2580000003 HC RX 258: Performed by: SURGERY

## 2024-05-29 PROCEDURE — 2709999900 HC NON-CHARGEABLE SUPPLY: Performed by: SURGERY

## 2024-05-29 PROCEDURE — 2580000003 HC RX 258: Performed by: ANESTHESIOLOGY

## 2024-05-29 PROCEDURE — 7100000001 HC PACU RECOVERY - ADDTL 15 MIN: Performed by: SURGERY

## 2024-05-29 PROCEDURE — 88304 TISSUE EXAM BY PATHOLOGIST: CPT

## 2024-05-29 PROCEDURE — 6360000002 HC RX W HCPCS: Performed by: SURGERY

## 2024-05-29 PROCEDURE — 6360000002 HC RX W HCPCS: Performed by: ANESTHESIOLOGY

## 2024-05-29 PROCEDURE — 2500000003 HC RX 250 WO HCPCS: Performed by: ANESTHESIOLOGY

## 2024-05-29 PROCEDURE — 3700000001 HC ADD 15 MINUTES (ANESTHESIA): Performed by: SURGERY

## 2024-05-29 PROCEDURE — 7100000000 HC PACU RECOVERY - FIRST 15 MIN: Performed by: SURGERY

## 2024-05-29 PROCEDURE — 7100000010 HC PHASE II RECOVERY - FIRST 15 MIN: Performed by: SURGERY

## 2024-05-29 PROCEDURE — 2500000003 HC RX 250 WO HCPCS: Performed by: SURGERY

## 2024-05-29 PROCEDURE — 85025 COMPLETE CBC W/AUTO DIFF WBC: CPT

## 2024-05-29 PROCEDURE — 3600000002 HC SURGERY LEVEL 2 BASE: Performed by: SURGERY

## 2024-05-29 PROCEDURE — 81025 URINE PREGNANCY TEST: CPT

## 2024-05-29 PROCEDURE — 7100000011 HC PHASE II RECOVERY - ADDTL 15 MIN: Performed by: SURGERY

## 2024-05-29 PROCEDURE — 36415 COLL VENOUS BLD VENIPUNCTURE: CPT

## 2024-05-29 RX ORDER — NEOSTIGMINE METHYLSULFATE 1 MG/ML
INJECTION, SOLUTION INTRAVENOUS PRN
Status: DISCONTINUED | OUTPATIENT
Start: 2024-05-29 | End: 2024-05-29 | Stop reason: SDUPTHER

## 2024-05-29 RX ORDER — OXYCODONE HYDROCHLORIDE AND ACETAMINOPHEN 5; 325 MG/1; MG/1
1 TABLET ORAL EVERY 4 HOURS PRN
Qty: 15 TABLET | Refills: 0 | Status: SHIPPED | OUTPATIENT
Start: 2024-05-29 | End: 2024-06-01

## 2024-05-29 RX ORDER — HYDROMORPHONE HYDROCHLORIDE 1 MG/ML
0.5 INJECTION, SOLUTION INTRAMUSCULAR; INTRAVENOUS; SUBCUTANEOUS EVERY 5 MIN PRN
Status: DISCONTINUED | OUTPATIENT
Start: 2024-05-29 | End: 2024-05-29 | Stop reason: HOSPADM

## 2024-05-29 RX ORDER — KETOROLAC TROMETHAMINE 30 MG/ML
INJECTION, SOLUTION INTRAMUSCULAR; INTRAVENOUS PRN
Status: DISCONTINUED | OUTPATIENT
Start: 2024-05-29 | End: 2024-05-29 | Stop reason: SDUPTHER

## 2024-05-29 RX ORDER — DIAZEPAM 5 MG/1
5 TABLET ORAL
Status: COMPLETED | OUTPATIENT
Start: 2024-05-29 | End: 2024-05-29

## 2024-05-29 RX ORDER — SODIUM CHLORIDE 0.9 % (FLUSH) 0.9 %
5-40 SYRINGE (ML) INJECTION EVERY 12 HOURS SCHEDULED
Status: DISCONTINUED | OUTPATIENT
Start: 2024-05-29 | End: 2024-05-29 | Stop reason: HOSPADM

## 2024-05-29 RX ORDER — PROPOFOL 10 MG/ML
INJECTION, EMULSION INTRAVENOUS PRN
Status: DISCONTINUED | OUTPATIENT
Start: 2024-05-29 | End: 2024-05-29 | Stop reason: SDUPTHER

## 2024-05-29 RX ORDER — DROPERIDOL 2.5 MG/ML
0.62 INJECTION, SOLUTION INTRAMUSCULAR; INTRAVENOUS
Status: COMPLETED | OUTPATIENT
Start: 2024-05-29 | End: 2024-05-29

## 2024-05-29 RX ORDER — SODIUM CHLORIDE, SODIUM LACTATE, POTASSIUM CHLORIDE, CALCIUM CHLORIDE 600; 310; 30; 20 MG/100ML; MG/100ML; MG/100ML; MG/100ML
INJECTION, SOLUTION INTRAVENOUS CONTINUOUS
Status: DISCONTINUED | OUTPATIENT
Start: 2024-05-29 | End: 2024-05-29 | Stop reason: HOSPADM

## 2024-05-29 RX ORDER — SODIUM CHLORIDE 9 MG/ML
INJECTION, SOLUTION INTRAVENOUS PRN
Status: DISCONTINUED | OUTPATIENT
Start: 2024-05-29 | End: 2024-05-29 | Stop reason: HOSPADM

## 2024-05-29 RX ORDER — MIDAZOLAM HYDROCHLORIDE 1 MG/ML
INJECTION INTRAMUSCULAR; INTRAVENOUS PRN
Status: DISCONTINUED | OUTPATIENT
Start: 2024-05-29 | End: 2024-05-29 | Stop reason: SDUPTHER

## 2024-05-29 RX ORDER — DEXAMETHASONE SODIUM PHOSPHATE 4 MG/ML
INJECTION, SOLUTION INTRA-ARTICULAR; INTRALESIONAL; INTRAMUSCULAR; INTRAVENOUS; SOFT TISSUE PRN
Status: DISCONTINUED | OUTPATIENT
Start: 2024-05-29 | End: 2024-05-29 | Stop reason: SDUPTHER

## 2024-05-29 RX ORDER — ROCURONIUM BROMIDE 10 MG/ML
INJECTION, SOLUTION INTRAVENOUS PRN
Status: DISCONTINUED | OUTPATIENT
Start: 2024-05-29 | End: 2024-05-29 | Stop reason: SDUPTHER

## 2024-05-29 RX ORDER — LIDOCAINE HYDROCHLORIDE 20 MG/ML
INJECTION, SOLUTION EPIDURAL; INFILTRATION; INTRACAUDAL; PERINEURAL PRN
Status: DISCONTINUED | OUTPATIENT
Start: 2024-05-29 | End: 2024-05-29 | Stop reason: SDUPTHER

## 2024-05-29 RX ORDER — ONDANSETRON 8 MG/1
8 TABLET, ORALLY DISINTEGRATING ORAL EVERY 8 HOURS PRN
Qty: 6 TABLET | Refills: 0 | Status: SHIPPED | OUTPATIENT
Start: 2024-05-29

## 2024-05-29 RX ORDER — MEPERIDINE HYDROCHLORIDE 50 MG/ML
12.5 INJECTION INTRAMUSCULAR; INTRAVENOUS; SUBCUTANEOUS AS NEEDED
Status: DISCONTINUED | OUTPATIENT
Start: 2024-05-29 | End: 2024-05-29 | Stop reason: HOSPADM

## 2024-05-29 RX ORDER — ONDANSETRON 2 MG/ML
4 INJECTION INTRAMUSCULAR; INTRAVENOUS
Status: COMPLETED | OUTPATIENT
Start: 2024-05-29 | End: 2024-05-29

## 2024-05-29 RX ORDER — OXYCODONE HYDROCHLORIDE 5 MG/1
5 TABLET ORAL
Status: COMPLETED | OUTPATIENT
Start: 2024-05-29 | End: 2024-05-29

## 2024-05-29 RX ORDER — SODIUM CHLORIDE 0.9 % (FLUSH) 0.9 %
5-40 SYRINGE (ML) INJECTION PRN
Status: DISCONTINUED | OUTPATIENT
Start: 2024-05-29 | End: 2024-05-29 | Stop reason: HOSPADM

## 2024-05-29 RX ORDER — IPRATROPIUM BROMIDE AND ALBUTEROL SULFATE 2.5; .5 MG/3ML; MG/3ML
1 SOLUTION RESPIRATORY (INHALATION)
Status: DISCONTINUED | OUTPATIENT
Start: 2024-05-29 | End: 2024-05-29 | Stop reason: HOSPADM

## 2024-05-29 RX ORDER — FENTANYL CITRATE 50 UG/ML
INJECTION, SOLUTION INTRAMUSCULAR; INTRAVENOUS PRN
Status: DISCONTINUED | OUTPATIENT
Start: 2024-05-29 | End: 2024-05-29 | Stop reason: SDUPTHER

## 2024-05-29 RX ORDER — LABETALOL HYDROCHLORIDE 5 MG/ML
10 INJECTION, SOLUTION INTRAVENOUS
Status: DISCONTINUED | OUTPATIENT
Start: 2024-05-29 | End: 2024-05-29 | Stop reason: HOSPADM

## 2024-05-29 RX ORDER — NALOXONE HYDROCHLORIDE 0.4 MG/ML
INJECTION, SOLUTION INTRAMUSCULAR; INTRAVENOUS; SUBCUTANEOUS PRN
Status: DISCONTINUED | OUTPATIENT
Start: 2024-05-29 | End: 2024-05-29 | Stop reason: HOSPADM

## 2024-05-29 RX ORDER — SCOLOPAMINE TRANSDERMAL SYSTEM 1 MG/1
1 PATCH, EXTENDED RELEASE TRANSDERMAL
Status: DISCONTINUED | OUTPATIENT
Start: 2024-05-29 | End: 2024-05-29 | Stop reason: HOSPADM

## 2024-05-29 RX ORDER — FENTANYL CITRATE 50 UG/ML
25 INJECTION, SOLUTION INTRAMUSCULAR; INTRAVENOUS EVERY 5 MIN PRN
Status: DISCONTINUED | OUTPATIENT
Start: 2024-05-29 | End: 2024-05-29 | Stop reason: HOSPADM

## 2024-05-29 RX ORDER — DIPHENHYDRAMINE HYDROCHLORIDE 50 MG/ML
12.5 INJECTION INTRAMUSCULAR; INTRAVENOUS
Status: DISCONTINUED | OUTPATIENT
Start: 2024-05-29 | End: 2024-05-29 | Stop reason: HOSPADM

## 2024-05-29 RX ORDER — BUPIVACAINE HYDROCHLORIDE AND EPINEPHRINE 5; 5 MG/ML; UG/ML
INJECTION, SOLUTION EPIDURAL; INTRACAUDAL; PERINEURAL PRN
Status: DISCONTINUED | OUTPATIENT
Start: 2024-05-29 | End: 2024-05-29 | Stop reason: ALTCHOICE

## 2024-05-29 RX ORDER — ONDANSETRON 2 MG/ML
INJECTION INTRAMUSCULAR; INTRAVENOUS PRN
Status: DISCONTINUED | OUTPATIENT
Start: 2024-05-29 | End: 2024-05-29 | Stop reason: SDUPTHER

## 2024-05-29 RX ORDER — GLYCOPYRROLATE 0.2 MG/ML
INJECTION INTRAMUSCULAR; INTRAVENOUS PRN
Status: DISCONTINUED | OUTPATIENT
Start: 2024-05-29 | End: 2024-05-29 | Stop reason: SDUPTHER

## 2024-05-29 RX ADMIN — PROPOFOL 150 MG: 10 INJECTION, EMULSION INTRAVENOUS at 11:20

## 2024-05-29 RX ADMIN — Medication 3 MG: at 11:56

## 2024-05-29 RX ADMIN — FENTANYL CITRATE 100 MCG: 50 INJECTION, SOLUTION INTRAMUSCULAR; INTRAVENOUS at 11:20

## 2024-05-29 RX ADMIN — GLYCOPYRROLATE 0.4 MG: 0.2 INJECTION INTRAMUSCULAR; INTRAVENOUS at 11:56

## 2024-05-29 RX ADMIN — MIDAZOLAM 2 MG: 1 INJECTION INTRAMUSCULAR; INTRAVENOUS at 11:14

## 2024-05-29 RX ADMIN — DEXAMETHASONE SODIUM PHOSPHATE 4 MG: 4 INJECTION, SOLUTION INTRA-ARTICULAR; INTRALESIONAL; INTRAMUSCULAR; INTRAVENOUS; SOFT TISSUE at 11:31

## 2024-05-29 RX ADMIN — KETOROLAC TROMETHAMINE 30 MG: 30 INJECTION, SOLUTION INTRAMUSCULAR; INTRAVENOUS at 11:48

## 2024-05-29 RX ADMIN — HYDROMORPHONE HYDROCHLORIDE 1 MG: 1 INJECTION, SOLUTION INTRAMUSCULAR; INTRAVENOUS; SUBCUTANEOUS at 11:37

## 2024-05-29 RX ADMIN — WATER 2000 MG: 1 INJECTION INTRAMUSCULAR; INTRAVENOUS; SUBCUTANEOUS at 11:23

## 2024-05-29 RX ADMIN — SODIUM CHLORIDE, SODIUM LACTATE, POTASSIUM CHLORIDE, AND CALCIUM CHLORIDE: 600; 310; 30; 20 INJECTION, SOLUTION INTRAVENOUS at 09:25

## 2024-05-29 RX ADMIN — ONDANSETRON 4 MG: 2 INJECTION INTRAMUSCULAR; INTRAVENOUS at 11:53

## 2024-05-29 RX ADMIN — DROPERIDOL 0.62 MG: 2.5 INJECTION, SOLUTION INTRAMUSCULAR; INTRAVENOUS at 12:50

## 2024-05-29 RX ADMIN — ROCURONIUM BROMIDE 30 MG: 10 INJECTION, SOLUTION INTRAVENOUS at 11:20

## 2024-05-29 RX ADMIN — SODIUM CHLORIDE, POTASSIUM CHLORIDE, SODIUM LACTATE AND CALCIUM CHLORIDE: 600; 310; 30; 20 INJECTION, SOLUTION INTRAVENOUS at 12:20

## 2024-05-29 RX ADMIN — LIDOCAINE HYDROCHLORIDE 60 MG: 20 INJECTION, SOLUTION EPIDURAL; INFILTRATION; INTRACAUDAL; PERINEURAL at 11:20

## 2024-05-29 RX ADMIN — ONDANSETRON 4 MG: 2 INJECTION INTRAMUSCULAR; INTRAVENOUS at 12:37

## 2024-05-29 RX ADMIN — DIAZEPAM 5 MG: 5 TABLET ORAL at 09:49

## 2024-05-29 RX ADMIN — OXYCODONE HYDROCHLORIDE 5 MG: 5 TABLET ORAL at 13:18

## 2024-05-29 ASSESSMENT — PAIN DESCRIPTION - DESCRIPTORS
DESCRIPTORS: SORE
DESCRIPTORS: ACHING

## 2024-05-29 ASSESSMENT — PAIN SCALES - GENERAL
PAINLEVEL_OUTOF10: 4
PAINLEVEL_OUTOF10: 4

## 2024-05-29 ASSESSMENT — PAIN - FUNCTIONAL ASSESSMENT: PAIN_FUNCTIONAL_ASSESSMENT: 0-10

## 2024-05-29 ASSESSMENT — PAIN DESCRIPTION - LOCATION
LOCATION: ABDOMEN
LOCATION: ABDOMEN

## 2024-05-29 NOTE — PERIOP NOTE
Reviewed PTA medication list with patient/caregiver and patient/caregiver denies any additional medications.     Patient admits to having a responsible adult care for them at home for at least 24 hours after surgery.    Patient encouraged to use gown warming system and informed that using said warming gown to regulate body temperature prior to a procedure has been shown to help reduce the risks of blood clots and infection.    Patient's pharmacy of choice verified and documented in PTA medication section.    Dual skin assessment & fall risk band verification completed with Tanna CARPENTER RN.     Urine pregnancy results Neg and verified with Tanna CARPENTER RN.

## 2024-05-29 NOTE — PERIOP NOTE
Reviewed discharge plan of care with patient and her . Written instructions provided as well. They verbalized understanding

## 2024-05-29 NOTE — INTERVAL H&P NOTE
Update History & Physical    The patient's History and Physical of May 29, 2024 was reviewed with the patient and I examined the patient. There was no change. The surgical site was confirmed by the patient and me.     Plan: The risks, benefits, expected outcome, and alternative to the recommended procedure have been discussed with the patient. Patient understands and wants to proceed with the procedure.     Electronically signed by LEAH FIELDS MD on 5/29/2024 at 11:08 AM

## 2024-05-29 NOTE — DISCHARGE INSTRUCTIONS
lifestyle greatly increases your risk for illness    A healthy diet, regular physical exercise & weight monitoring are important for maintaining a healthy lifestyle    You may be retaining fluid if you have a history of heart failure or if you experience any of the following symptoms:  Weight gain of 3 pounds or more overnight or 5 pounds in a week, increased swelling in our hands or feet or shortness of breath while lying flat in bed.  Please call your doctor as soon as you notice any of these symptoms; do not wait until your next office visit.      The discharge information has been reviewed with the patient and spouse.  The patient and spouse verbalized understanding.  Discharge medications reviewed with the patient and spouse and appropriate educational materials and side effects teaching were provided.           Nausea and Vomiting After Surgery: Care Instructions  Your Care Instructions     After you've had surgery, you may feel sick to your stomach (nauseated) or you may vomit. Sometimes anesthesia can make you feel sick. It's a common side effect and often doesn't last long. Pain also can make you feel sick or vomit. After the anesthesia wears off, you may feel pain from the incision (cut). That pain could then upset your stomach. Taking pain medicine can also make you feel sick to your stomach.  Whatever the cause, you may get medicine that can help. There are also some things you can do at home to prevent nausea and feel better.  The doctor has checked you carefully, but problems can develop later. If you notice any problems or new symptoms, get medical treatment right away.  Follow-up care is a key part of your treatment and safety. Be sure to make and go to all appointments, and call your doctor if you are having problems. It's also a good idea to know your test results and keep a list of the medicines you take.  How can you care for yourself at home?  Take your pain medicine as soon as you have pain. It

## 2024-05-29 NOTE — BRIEF OP NOTE
Brief Postoperative Note      Patient: Dulce Fermin  YOB: 1970  MRN: [id number]    Date of Procedure: 5/29/2024    Pre-Op Diagnosis Codes:     * Cholelithiasis [K80.20]     * Gallbladder sludge [K82.8]    Post-Op Diagnosis: Same       Procedure(s):  LAPAROSCOPIC CHOLECYSTECTOMY    Surgeon(s):  Leah Doran MD    Assistant:  Surgical Assistant: Essie Falk    Anesthesia: General    Estimated Blood Loss (mL): Minimal    Complications: None    Specimens:   ID Type Source Tests Collected by Time Destination   A : GALLBLADDER AND CONTENTS Tissue Gallbladder SURGICAL PATHOLOGY Post, Leah WALLER MD 5/29/2024 1147        Implants:  * No implants in log *      Drains: * No LDAs found *    Findings:  Infection Present At Time Of Surgery (PATOS) (choose all levels that have infection present):  No infection present  Other Findings: see op note    Electronically signed by LEAH DORAN MD on 5/29/2024 at 12:08 PM    Op note dictated #[id number]  RP

## 2024-05-29 NOTE — OP NOTE
[address]  [address]                            OPERATIVE REPORT      PATIENT NAME: AMIRA ARELLANO                 : 1970  MED REC NO: [id number]                       ROOM: OR  ACCOUNT NO: [id number]                       ADMIT DATE: 2024  PROVIDER: Joey Doran MD    DATE OF SERVICE:  2024    PREOPERATIVE DIAGNOSES:  Symptomatic cholelithiasis.    POSTOPERATIVE DIAGNOSES:  Symptomatic cholelithiasis.    PROCEDURES PERFORMED:  Laparoscopic cholecystectomy.    SURGEON:  Joey Doran MD    ASSISTANT:  None.    ANESTHESIA:  General.    ESTIMATED BLOOD LOSS:  Minimal.    SPECIMENS REMOVED:  Gallbladder contents for permanent pathology.    INTRAOPERATIVE FINDINGS:  ***     COMPLICATIONS:  None.    IMPLANTS:  None.    INDICATIONS:  ***    DESCRIPTION OF PROCEDURE:  The patient is a 54-year-old white female, referred to me for clinical and radiologic findings consistent with biliary colic secondary to cholelithiasis.  Preoperative liver function tests were normal.  Liver ultrasound was normal.  She had a single dominant gallstone measuring just over 1 cm in dimension as well as gallbladder sludge on ultrasound.  Due to the increasingly symptomatic nature of her cholelithiasis, she did wish to proceed with surgery.  I discussed with her the nature of surgery, alternatives, benefits, and risks.  She gave consent to proceed.    She was taken to the OR on may 29, 2024.  She was met and identified in the preoperative holding area by myself and the team and brought back to the OR after voiding in the preop holding.  Once in the OR, she was positioned on table and all pressure points appropriately padded.  Sequential compression devices were applied.  General anesthesia was administered with monitored notch placed.  The area was prepped and draped in the usual sterile fashion.  Preoperative antibiotics were given per protocol.

## 2024-05-29 NOTE — PERIOP NOTE
TRANSFER - OUT REPORT:    Verbal report given to Juliane NICHOLAS on Dulce Fermin  being transferred to Phase 2 for routine progression of patient care       Report consisted of patient's Situation, Background, Assessment and   Recommendations(SBAR).     Information from the following report(s) Adult Overview, Surgery Report, Intake/Output, and MAR was reviewed with the receiving nurse.           Lines:   Peripheral IV 05/29/24 Left Forearm (Active)   Site Assessment Clean, dry & intact 05/29/24 1225   Line Status Infusing 05/29/24 1225   Line Care Connections checked and tightened 05/29/24 1030   Phlebitis Assessment No symptoms 05/29/24 1225   Infiltration Assessment 0 05/29/24 1225   Dressing Status Clean, dry & intact 05/29/24 1225   Dressing Type Transparent 05/29/24 1225        Opportunity for questions and clarification was provided.      Patient transported with:  Registered Nurse

## 2024-05-29 NOTE — ANESTHESIA POSTPROCEDURE EVALUATION
Department of Anesthesiology  Postprocedure Note    Patient: Dulce Fermin  MRN: [id number]  YOB: 1970  Date of evaluation: 5/29/2024    Procedure Summary       Date: 05/29/24 Room / Location: [facility] MAIN 07 / [facility] MAIN OR    Anesthesia Start: 1114 Anesthesia Stop: 1214    Procedure: LAPAROSCOPIC CHOLECYSTECTOMY (Abdomen) Diagnosis:       Calculus of gallbladder with cholecystitis with biliary obstruction, unspecified cholecystitis acuity      Adhesion of gallbladder      (Calculus of gallbladder with cholecystitis with biliary obstruction, unspecified cholecystitis acuity [K80.11])      (Adhesion of gallbladder [K82.8])    Surgeons: Joey Doran MD Responsible Provider: Randy Hill MD    Anesthesia Type: general ASA Status: 1            Anesthesia Type: No value filed.    Malik Phase I:      Malik Phase II:      Anesthesia Post Evaluation    Patient location during evaluation: bedside  Patient participation: complete - patient participated  Level of consciousness: awake  Airway patency: patent  Nausea & Vomiting: no nausea and no vomiting  Cardiovascular status: hemodynamically stable  Respiratory status: acceptable  Hydration status: stable  Pain management: satisfactory to patient    No notable events documented.

## 2024-05-29 NOTE — PERIOP NOTE
TRANSFER - IN REPORT:    Verbal report received from OR RN on Dulce Fermin  being received from OR for routine progression of patient care      Report consisted of patient's Situation, Background, Assessment and   Recommendations(SBAR).     Information from the following report(s) Adult Overview, Surgery Report, Intake/Output, and MAR was reviewed with the receiving nurse.    Opportunity for questions and clarification was provided.      Assessment completed upon patient's arrival to unit and care assumed.

## 2024-05-29 NOTE — ANESTHESIA PRE PROCEDURE
Department of Anesthesiology  Preprocedure Note       Name:  Dulce Fermin   Age:  54 y.o.  :  1970                                          MRN:  [id number]         Date:  2024      Surgeon: Surgeon(s):  Joey Doran MD    Procedure: Procedure(s):  LAPAROSCOPIC CHOLECYSTECTOMY    Medications prior to admission:   Prior to Admission medications    Medication Sig Start Date End Date Taking? Authorizing Provider   buPROPion (WELLBUTRIN XL) 300 MG extended release tablet Take 1 tablet by mouth every morning    ProviderDarek MD   estradiol (CLIMARA) 0.1 MG/24HR Place 1 patch onto the skin once a week    Provider, MD Darek       Current medications:    Current Facility-Administered Medications   Medication Dose Route Frequency Provider Last Rate Last Admin   • lactated ringers IV soln infusion   IntraVENous Continuous Joey Doran  mL/hr at 24 0925 New Bag at 24 0925   • scopolamine (TRANSDERM-SCOP) transdermal patch 1 patch  1 patch TransDERmal NOW Randy Hill MD   1 patch at 24 1110     Facility-Administered Medications Ordered in Other Encounters   Medication Dose Route Frequency Provider Last Rate Last Admin   • midazolam (VERSED) injection   IntraVENous PRN Randy Hill MD   2 mg at 24 1114   • fentaNYL (SUBLIMAZE) injection   IntraVENous PRN Randy Hill MD   100 mcg at 24 1120   • lidocaine PF 2 % injection   IntraVENous PRN Randy Hill MD   60 mg at 24 1120   • propofol infusion   IntraVENous PRN Randy Hill MD   150 mg at 24 1120   • rocuronium (ZEMURON) injection   IntraVENous PRN Randy Hill MD   30 mg at 24 1120   • dexAMETHasone (DECADRON) injection   IntraVENous PRN Randy Hill MD   4 mg at 24 1131   • HYDROmorphone (DILAUDID) injection   IntraVENous PRN Randy Hill MD   1 mg at 24 1137       Allergies:  No Known Allergies    Problem List:  There is no problem list

## (undated) DEVICE — SYRINGE MED 50ML LUERLOCK TIP

## (undated) DEVICE — SUTURE MONOCRYL + SZ 4-0 L27IN ABSRB UD L19MM PS-2 3/8 CIR MCP426H

## (undated) DEVICE — GOWN,SIRUS,NONRNF,SETINSLV,XL,20/CS: Brand: MEDLINE

## (undated) DEVICE — SOLUTION IV 1000ML 0.9% SOD CHL

## (undated) DEVICE — GLOVE SURG SZ 8 L12IN THK75MIL DK GRN LTX FREE

## (undated) DEVICE — TROCAR: Brand: KII® SLEEVE

## (undated) DEVICE — Z DISCONTINUED USE 2220241 SUTURE SZ 0 27IN 5/8 CIR UR-6  TAPER PT VIOLET ABSRB VICRYL J603H

## (undated) DEVICE — SOLUTION IV LACTATED RINGERS INJECTION USP

## (undated) DEVICE — APPLIER CLP M L L11.4IN DIA10MM ENDOSCP ROT MULT FOR LIG

## (undated) DEVICE — LIQUIBAND RAPID ADHESIVE 36/CS 0.8ML: Brand: MEDLINE

## (undated) DEVICE — E-Z CLEAN, PTFE COATED, ELECTROSURGICAL LAPAROSCOPIC ELECTRODE, L-HOOK, 33 CM., SINGLE-USE, FOR USE WITH HAND CONTROL PENCIL: Brand: MEGADYNE

## (undated) DEVICE — INSUFFLATION NEEDLE TO ESTABLISH PNEUMOPERITONEUM.: Brand: INSUFFLATION NEEDLE

## (undated) DEVICE — LAPAROSCOPIC TROCAR SLEEVE/SINGLE USE: Brand: KII® OPTICAL ACCESS SYSTEM

## (undated) DEVICE — ENDOCUT SCISSOR TIP, DISPOSABLE: Brand: RENEW

## (undated) DEVICE — [HIGH FLOW INSUFFLATOR,  DO NOT USE IF PACKAGE IS DAMAGED,  KEEP DRY,  KEEP AWAY FROM SUNLIGHT,  PROTECT FROM HEAT AND RADIOACTIVE SOURCES.]: Brand: PNEUMOSURE

## (undated) DEVICE — SYRINGE MED 10ML LUERLOCK TIP W/O SFTY DISP

## (undated) DEVICE — SUTURE VICRYL + SZ 3-0 L27IN ABSRB UD L26MM SH 1/2 CIR VCP416H

## (undated) DEVICE — LAP CHOLE: Brand: MEDLINE INDUSTRIES, INC.

## (undated) DEVICE — SET TBNG DISP TIP FOR AHTO

## (undated) DEVICE — BLADE,CARBON-STEEL,15,STRL,DISPOSABLE,TB: Brand: MEDLINE

## (undated) DEVICE — KIT,ANTI FOG,W/SPONGE & FLUID,SOFT PACK: Brand: MEDLINE

## (undated) DEVICE — GLOVE ORANGE PI 7 1/2   MSG9075

## (undated) DEVICE — TROCAR: Brand: KII® OPTICAL ACCESS SYSTEM